# Patient Record
Sex: FEMALE | Race: WHITE | Employment: FULL TIME | URBAN - METROPOLITAN AREA
[De-identification: names, ages, dates, MRNs, and addresses within clinical notes are randomized per-mention and may not be internally consistent; named-entity substitution may affect disease eponyms.]

---

## 2017-03-20 ENCOUNTER — HOSPITAL ENCOUNTER (OUTPATIENT)
Dept: BEHAVIORAL HEALTH | Facility: CLINIC | Age: 52
End: 2017-03-20
Attending: SOCIAL WORKER
Payer: COMMERCIAL

## 2017-03-20 VITALS
HEIGHT: 62 IN | SYSTOLIC BLOOD PRESSURE: 167 MMHG | BODY MASS INDEX: 23 KG/M2 | HEART RATE: 91 BPM | WEIGHT: 125 LBS | DIASTOLIC BLOOD PRESSURE: 104 MMHG

## 2017-03-20 PROCEDURE — H0001 ALCOHOL AND/OR DRUG ASSESS: HCPCS

## 2017-03-20 ASSESSMENT — ANXIETY QUESTIONNAIRES
6. BECOMING EASILY ANNOYED OR IRRITABLE: SEVERAL DAYS
1. FEELING NERVOUS, ANXIOUS, OR ON EDGE: NEARLY EVERY DAY
GAD7 TOTAL SCORE: 10
7. FEELING AFRAID AS IF SOMETHING AWFUL MIGHT HAPPEN: NOT AT ALL
2. NOT BEING ABLE TO STOP OR CONTROL WORRYING: MORE THAN HALF THE DAYS
4. TROUBLE RELAXING: MORE THAN HALF THE DAYS
3. WORRYING TOO MUCH ABOUT DIFFERENT THINGS: MORE THAN HALF THE DAYS
5. BEING SO RESTLESS THAT IT IS HARD TO SIT STILL: NOT AT ALL

## 2017-03-20 ASSESSMENT — PAIN SCALES - GENERAL: PAINLEVEL: NO PAIN (0)

## 2017-03-21 ENCOUNTER — HOSPITAL ENCOUNTER (OUTPATIENT)
Dept: BEHAVIORAL HEALTH | Facility: CLINIC | Age: 52
Discharge: HOME | End: 2017-03-21
Attending: SOCIAL WORKER | Admitting: SOCIAL WORKER
Payer: COMMERCIAL

## 2017-03-21 VITALS
BODY MASS INDEX: 22.15 KG/M2 | SYSTOLIC BLOOD PRESSURE: 174 MMHG | HEIGHT: 63 IN | DIASTOLIC BLOOD PRESSURE: 94 MMHG | WEIGHT: 125 LBS | HEART RATE: 98 BPM

## 2017-03-21 PROCEDURE — H0001 ALCOHOL AND/OR DRUG ASSESS: HCPCS

## 2017-03-21 ASSESSMENT — PAIN SCALES - GENERAL: PAINLEVEL: NO PAIN (0)

## 2017-03-21 ASSESSMENT — ANXIETY QUESTIONNAIRES
GAD7 TOTAL SCORE: 10
4. TROUBLE RELAXING: NOT AT ALL
1. FEELING NERVOUS, ANXIOUS, OR ON EDGE: MORE THAN HALF THE DAYS
3. WORRYING TOO MUCH ABOUT DIFFERENT THINGS: MORE THAN HALF THE DAYS
7. FEELING AFRAID AS IF SOMETHING AWFUL MIGHT HAPPEN: NOT AT ALL
2. NOT BEING ABLE TO STOP OR CONTROL WORRYING: MORE THAN HALF THE DAYS
6. BECOMING EASILY ANNOYED OR IRRITABLE: SEVERAL DAYS
5. BEING SO RESTLESS THAT IT IS HARD TO SIT STILL: NOT AT ALL
GAD7 TOTAL SCORE: 7

## 2017-03-21 ASSESSMENT — PATIENT HEALTH QUESTIONNAIRE - PHQ9: SUM OF ALL RESPONSES TO PHQ QUESTIONS 1-9: 14
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
## 2017-03-21 NOTE — PROGRESS NOTES
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 involvement that is harmful to recovery or treatment progress, or (B) Client has an actively antagonistic significant other, family, work, or living environment with immediate threat to the client s safety and well-being.     REASONS SEVERITY WAS ASSIGNED (What support does the person have for making changes? What structure/stability does the person have in his or her daily life that will increase the likelihood that changes can be sustained? What problems exist in the person s environment that will jeopardize getting/staying clean and sober?)     Patient lives with her boyfriend sometimes, or she stays at a hotel or in her car. Her current living situation is unsupportive towards recovery. Patient reported having relationship conflict with boyfriend due to their ongoing substance use. Patient lacks a current sober support network. Patient denied having any concerns regarding immediate living environment or neighborhood. Patient is employed part-time and lacks a daily structure and meaningful activities. Patient reported a history of legal issues, including 2 DUIs in her 20s. Patient denied current legal involvement.          Client Choice/Exceptions   Would you like services specific to  language, age, gender, culture, Jain preference, race, ethnicity, sexual orientation or disability?  No     What particular treatment choices and options would you like to have? Inpatient     Do you have a preference for a particular treatment program? [facility], but they have a waitlist.          Criteria for Diagnosis   DSM-5 Criteria for Substance Use Disorder     Instructions: Determine whether the client currently meets the criteria for Substance Use Disorder using the diagnostic criteria in the DSM-V pp.[id number]. Current means during the most recent 12 months outside a facility that controls access to substances.     Category of substance Severity (ICD-10 Code / DSM 5 Code)   Alcohol Use Disorder   Severe  (10.20) (303.90)   Cannabis Use Disorder   NA   Hallucinogen Use Disorder   NA   Inhalant Use Disorder   NA   Opioid Use Disorder   NA   Sedative, Hypnotic or Anxiolytic Use Disorder   NA   Stimulant Use Disorder   Mild  (F14.10) (305.60) Cocaine   Tobacco Use Disorder   NA   Other (or Unknown) Substance Use Disorder   NA   Does not meet DSM-5 criteria for substance use disorder   NA - See above       Collateral Contact Summary   Number of contacts made: 1   Contact with referring person: NA   If court related records were reviewed, summarize here: NA         Rule 25 Assessment Summary and Plan   's Recommendation    It is recommended that patient:  1). Participate in and complete a 28-day lodging/residential substance use treatment program.  2). Follow all subsequent recommendations of the substance use treatment providers.   3). Abstain from alcohol and all mood-altering substances, except as prescribed. Take all medications as prescribed.   4). Attend AA at least three times weekly and obtain a female sponsor for additional sober supports. Consider attending Al-Anon to address effects of alcohol use in family of origin.  5). Become involved in a daily sober recreational activity/hobby of  her own interest.  6). Have a mental health evaluation to determine accurate diagnoses and which psychotropic medications would be effective.   7). Begin weekly individual mental health therapy with a trauma-informed therapist.  8). Work with primary counselor to address suicidal ideation and complete the Patient Safety Plan.          Collateral Contacts   Name:    James Olson   Relationship:     Ex- Phone Number:    [phone] Releases:    Yes   Information provided:    On 03/21/2017, Mr. Olson completed a Concerned Person Information Sheet. He stated he used to drink heavily with patient years ago, but she has not slowed down for at least 15 years. He stated patient drinks at least 3 to 4 alcohol drinks daily. She uses cocaine, but he doesn't know how much or how often. She is not a regular marijuana user. He stated she has continued to use alcohol despite many consequences. Since he has known her, she has had depression and thoughts of suicide several times.          DSM 5 Criteria   A problematic pattern of alcohol/drug use leading to clinically significant impairment or distress, as manifested by at least two of the following, occurring within a 12-month period: 11/11     Alcohol/drug is often taken in larger amounts or over a longer period than was intended.  There is a persistent desire or unsuccessful efforts to cut down or control alcohol/drug use  A great deal of time is spent in activities necessary to obtain alcohol, use alcohol, or recover from its effects.  Craving, or a strong desire or urge to use alcohol/drug  Recurrent alcohol/drug use resulting in a failure to fulfill major role obligations at work, school or home.  Continued alcohol use despite having persistent or recurrent social or interpersonal problems caused or exacerbated by the effects of alcohol/drug.  Important social, occupational, or recreational activities are given up or reduced because of alcohol/drug use.  Recurrent  alcohol/drug use in situations in which it is physically hazardous.  Alcohol/drug use is continued despite knowledge of having a persistent or recurrent physical or psychological problem that is likely to have been caused or exacerbated by alcohol.  Tolerance, as defined by either of the following: A need for markedly increased amounts of alcohol/drug to achieve intoxication or desired effect.  Withdrawal, as manifested by either of the following: The characteristic withdrawal syndrome for alcohol/drug (refer to Criteria A and B of the criteria set for alcohol/drug withdrawal).     Specify if: In early remission:  After full criteria for alcohol/drug use disorder were previously met, none of the criteria for alcohol/drug use disorder have been met for at least 3 months but for less than 12 months (with the exception that Criterion A4,  Craving or a strong desire or urge to use alcohol/drug  may be met).      In sustained remission:   After full criteria for alcohol use disorder were previously met, non of the criteria for alcohol/drug use disorder have been met at any time during a period of 12 months or longer (with the exception that Criterion A4,  Craving or strong desire or urge to use alcohol/drug  may be met).    Specify if:   This additional specifier is used if the individual is in an environment where access to alcohol is restricted.  Mild: Presence of 2-3 symptoms  Moderate: Presence of 4-5 symptoms  Severe: Presence of 6 or more symptoms

## 2017-03-22 ASSESSMENT — ANXIETY QUESTIONNAIRES: GAD7 TOTAL SCORE: 7

## 2017-03-22 ASSESSMENT — PATIENT HEALTH QUESTIONNAIRE - PHQ9: SUM OF ALL RESPONSES TO PHQ QUESTIONS 1-9: 11
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
## 2017-03-29 ENCOUNTER — HOSPITAL ENCOUNTER (OUTPATIENT)
Dept: BEHAVIORAL HEALTH | Facility: CLINIC | Age: 52
End: 2017-03-29
Attending: PSYCHIATRY & NEUROLOGY
Payer: COMMERCIAL

## 2017-03-29 VITALS — SYSTOLIC BLOOD PRESSURE: 147 MMHG | HEART RATE: 96 BPM | TEMPERATURE: 98.8 F | DIASTOLIC BLOOD PRESSURE: 86 MMHG

## 2017-03-29 PROBLEM — F19.20 CHEMICAL DEPENDENCY (H): Status: ACTIVE | Noted: 2017-03-29

## 2017-03-29 PROCEDURE — 10020000 ZZH LODGING PLUS FACILITY CHARGE ADULT

## 2017-03-29 RX ORDER — LORATADINE 10 MG/1
10 TABLET ORAL DAILY
COMMUNITY

## 2017-03-29 RX ORDER — MAGNESIUM HYDROXIDE/ALUMINUM HYDROXICE/SIMETHICONE 120; 1200; 1200 MG/30ML; MG/30ML; MG/30ML
30 SUSPENSION ORAL EVERY 6 HOURS PRN
COMMUNITY

## 2017-03-29 RX ORDER — EPINEPHRINE 0.3 MG/.3ML
0.3 INJECTION SUBCUTANEOUS PRN
COMMUNITY

## 2017-03-29 RX ORDER — AMOXICILLIN 250 MG
2 CAPSULE ORAL
COMMUNITY

## 2017-03-29 RX ORDER — ALBUTEROL SULFATE 90 UG/1
1-2 AEROSOL, METERED RESPIRATORY (INHALATION) EVERY 4 HOURS PRN
COMMUNITY
End: 2017-04-03

## 2017-03-29 RX ORDER — NICOTINE 21 MG/24HR
1 PATCH, TRANSDERMAL 24 HOURS TRANSDERMAL EVERY 24 HOURS
COMMUNITY
End: 2017-04-03

## 2017-03-29 ASSESSMENT — ANXIETY QUESTIONNAIRES
6. BECOMING EASILY ANNOYED OR IRRITABLE: SEVERAL DAYS
2. NOT BEING ABLE TO STOP OR CONTROL WORRYING: SEVERAL DAYS
4. TROUBLE RELAXING: NOT AT ALL
1. FEELING NERVOUS, ANXIOUS, OR ON EDGE: MORE THAN HALF THE DAYS
3. WORRYING TOO MUCH ABOUT DIFFERENT THINGS: SEVERAL DAYS
5. BEING SO RESTLESS THAT IT IS HARD TO SIT STILL: NOT AT ALL
7. FEELING AFRAID AS IF SOMETHING AWFUL MIGHT HAPPEN: NOT AT ALL
GAD7 TOTAL SCORE: 5

## 2017-03-29 NOTE — PROGRESS NOTES
LP Admission Update    Date of update:     3/29/2017 Update Evaluation Counselor:      Ellie Hill     Date of original Rule 25:    3/21/2017    Original Evaluation Counselor:      Bernadette Urbina MSW, [facility], [facility]       PHQ-9  Score 10 or greater No   CHIP-7  Score 10 or greater No   Patient has suicidal ideation No   Patient needs medication adjustment No   Patient has worsening depression No   Patient has omaira No   Patient has unmanageable anxiety, behavioral interventions have not been successful No   Patient wants to be seen for Naltrexone or Antabuse No   *Patient has seen Dr. Rodriguez on 3A/psych/medical floor.  (if yes, the patient will need a follow-up) No   Transfer from in-house medical or psych unit No   Patients with a mental health diagnosis coming from shelter without any medications No   Patient with a history of anxiety whose DOC was methamphetamine, cocaine or crack No   Other clinical justification for a mental health evaluation  Please explain: NA   No   If the patient responded yes to any of the above questions except for *, then the patient would need to be scheduled for an initial mental health screen.     Suicide Screening Questions:     1. Are you feeling hopeless about the present/future? Mixed   2. Have you ever had thoughts about taking your life? Yes   3. When did you have these thoughts? She wishes she weren't here, but denied suicidal plan and intent. She stated she is terrified. She denied past suicide attempts.   4. Do you have any current intent or active desire to take your life? No   5. Do you have a plan to take your life?  No   6. Have you ever made a suicide attempt? No   7. Do you have access to pills, guns or other methods to kill yourself? No                  Risk Status - Use as Guide/Example     Ideation - Active  Thoughts of suicide Intent to follow  Through on suicide Plan for completing  suicide    Yes No Yes No Yes No   Emergent X   X   X     Urgent /  "Non-Emergent X   X     X   Non-Urgent X     X   X   No Current / Active Risk (Past 6 Months)   X   X   X   Haley Olson Yes No No         Additional Risk Factors: Significant history of having untreated or poorly treated mental health symptoms  A recent death of someone close to the patient and/or unresolved grief and loss issues  Significant history of trauma and/or abuse issues  History of impulsive or aggressive behaviors   Protective Factors:  Having people in his/her life that would prevent the patient from considering committing suicide (i.e. young children, spouse, parents, etc.)  Having easy access to supportive family members  Having restricted access to highly lethal means of suicide      Risk Status:     Emergent? No  Urgent / Non-Emergent?  No  Present / Non- Urgent? Yes, Document in Epic / SBAR to counselor, Collaborate with patient / client to develop \"Patient Safety Plan\", Referral to PCP or psychiatrist, Address in Treatment Plan, Continuous monitoring, assessment and intervention and Address in Discharge / Transition Plan   No Current Risk? See above     Additional information to support suicide risk rating: Patient wishes she were not alive, but denied suicidal plan and intent.         Current PATRICK:       .000   Current UA:   Positive for TCA and BZO and negative for all other screened drugs.     Alcohol/Drug use since last CD evaluation (include date and time of last use):   Last use of alcohol: 3/28/2017 at midnight  Last use of cocaine: 1 week ago  Benzo: 2-3 days ago. BF gave her something for her hangover.  TCA: false positive for benadryl     Please note any other clinical changes since the last CD evaluation (such as medication changes, additional legal charges, detoxification admissions, overdoses, etc):    No significant changes since the last CD evaluation       Current ASAM Dimensions      Intoxication and Withdrawal: 1  Biomedical:  1  Emotional and Behavioral:  2  Readiness to Change: "  0  Relapse Potential: 4  Recovery Environmental:  4

## 2017-03-29 NOTE — PROGRESS NOTES
Initial Services Plan        Before your first treatment group, please do the following    Immediate health & safety concerns: Look for sober housing and a supportive social network.  Obtain personal items (glasses, hearing aides, medicines, diabetes supplies, clothing, etc.).  Look for a support network (such as AA, NA, DBT group, a Jew group, etc.)    Suggestions for client during the time between intake & completion of treatment plan:  Tour your treatment center (unit or outpatient clinic).  Introduce yourself to the treatment group.  Spend time getting to know your peers.  Complete your psycho-social paperwork.  Complete the problem list for your treatment plan.  Start drug and alcohol use history.  Review your patient or client handbook.  Identify concerns about whom to ask for family week    Client issues to be addressed in the first treatment sessions:  Identify concerns about coming into treatment, i.e. fear of failing again, sharing a room in treatment      SRINI Willingham  3/29/2017  7:54 AM

## 2017-03-29 NOTE — PROGRESS NOTES
[facility] Health Assessment    Patient Name:  Haley Olson  Date of review:  3/29/2017  Vital signs: BP: 147/86   Pulse:  96   Temp: 98.8    Direct admission    Counselor: Michael Helton  Drug of Choice: Alcohol  Last use: 3/28/2017  Home clinic/MD: None  Psychiatrist/therapist: None    Medical history/current conditions: None - but needs H & P as it has been over a year since last one and needs a refill on her albuterol inhaler    Mental Health diagnosis: None  Medication compliant?: Yes  Recent sucidal thoughts? None     When? N/A  Current thought of self-harm? None    Plan? N/A  Pt. Self rating of impulsiveness? (1-10 scale): 2    Pain assessment:   Pt. Experiencing pain at this time? No  Rating on 0-10 scale: (1-10 scale): 0  Location: None  Result of: N/A  L P pain management strategy: OTC medications  On-going nursing intervention required?   No
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
## 2017-03-30 ENCOUNTER — HOSPITAL ENCOUNTER (OUTPATIENT)
Dept: BEHAVIORAL HEALTH | Facility: CLINIC | Age: 52
End: 2017-03-30
Attending: PSYCHIATRY & NEUROLOGY
Payer: COMMERCIAL

## 2017-03-30 PROCEDURE — 10020000 ZZH LODGING PLUS FACILITY CHARGE ADULT

## 2017-03-30 PROCEDURE — H2035 A/D TX PROGRAM, PER HOUR: HCPCS | Mod: HQ

## 2017-03-30 ASSESSMENT — ANXIETY QUESTIONNAIRES: GAD7 TOTAL SCORE: 5

## 2017-03-30 ASSESSMENT — PATIENT HEALTH QUESTIONNAIRE - PHQ9: SUM OF ALL RESPONSES TO PHQ QUESTIONS 1-9: 7

## 2017-03-30 NOTE — PROGRESS NOTES
Patient Safety Plan  Step 1: Warning signs (thoughts, images, mood, situation, behavior) that a crisis may be developin. Increased heart rate  2. Anxiety   3. Feeling sad and afraid    Step 2: Internal coping strategies -- Things I can do to take my mind off my problems without contacting another person (relaxation techniques, mindfulness, physical activity):  1. Deep Breaths  2. Exercise  3. A good cry    Step 3: People and social settings that provide distraction:  1. Name Johnathan Breaux       Phone Available on cell phone  2. Name Marbin Ross         Phone: [phone]  3. Name Kinga Álvarez       Phone: [phone]        4. ________________________    Step 4: People whom I can ask for help:               Same as above    Step 5: Professionals or agencies I can contact during a crisis:    1. While in treatment at , contact counseling or support staff if having a crisis or any thought of self-harm.  2. If you have a mental health professional (psychotherapist, psychiatrist): No  Clinician pager or emergency contact #_______________________________  3. If you have a primary care physician (PCP) No- I d go to urgent care    See list below for additional resources:  Crisis Connection [phone]      [facility] [phone]                               [facility] [phone]                [facility]  [phone]  [facility] Crisis Line ([facility])[phone]   [facility] Crisis Services ([facility]) [phone]  Mental Health Crisis Program ([facility]) [phone]  National Suicide Prevention [phone]        [facility] [phone]  Suicide Prevention [phone]              Text for Life: Text the word  LIFE  to 07039.         Call 061 or go to your nearest Emergency department.    Step 6: Ways I can make my environment safer:    1. Recycling    Step 7: The one thing that is most important to me and worth living for is: My son and my boyfriend    Step  8: Client has reviewed with counseling staff and agrees to this safety plan. Client has been given a copy of the plan:    Client Signature: ______________________________Time/Date: _______________    Staff Signature:  ______________________________Time/Date: _______________

## 2017-03-30 NOTE — PROGRESS NOTES
1400 - Pt offered Tamiflu for potential exposure to Influenza B. Pt agreed. TO order from Dr. Torres for the following:    Oseltamivir Phosphate 75 mg caps.  Take one capsule by mouth every day for 7 days. Dispense 7 capsules. No refills    [facility]    Also, good handwashing technique reviewed with pt

## 2017-03-31 ENCOUNTER — HOSPITAL ENCOUNTER (OUTPATIENT)
Dept: BEHAVIORAL HEALTH | Facility: CLINIC | Age: 52
End: 2017-03-31
Attending: PSYCHIATRY & NEUROLOGY
Payer: COMMERCIAL

## 2017-03-31 PROCEDURE — 10020000 ZZH LODGING PLUS FACILITY CHARGE ADULT

## 2017-03-31 PROCEDURE — H2035 A/D TX PROGRAM, PER HOUR: HCPCS | Mod: HQ

## 2017-03-31 PROCEDURE — H2035 A/D TX PROGRAM, PER HOUR: HCPCS

## 2017-03-31 NOTE — PROGRESS NOTES
Comprehensive Assessment Summary     Based on client interview, review of previous assessments and   comprehensive assessment interview the following diagnosis and recommendations are:     Patient: Haley Olson  MRN; [id number]   : 1965  Age: 51 year old Sex: female       Client meets criteria for:  303.90 Alcohol Dependence  305.10 Nicotine Dependence    Dimension One: Acute Intoxication/Withdrawal Potential     Ratin  (Consider the client's ability to cope with withdrawal symptoms and current state of intoxication)     Pt reports her last use of alcohol was on 3/28/2017. Pt reported drinking around eight beers the day before entering treatment and denies any current withdrawal symptoms.     Dimension Two: Biomedical Condition and Complications    Ratin  (Consider the degree to which any physical disorder would interfere with treatment for substance abuse, and the client's ability to tolerate any related discomfort; determine the impact of continued chemical use on the unborn child if the client is pregnant)     Pt denies any medical diagnosis or concerning medical symptoms that would interfere with treatment participation. Pt verbalizes the desire to see an MD for an updated physical while in treatment. Pt would also like the opportunity to be set up with a primary provider within the community.     Dimension Three: Emotional/Behavioral/Cognitive Conditions & Complications  Ratin  (Determine the degree to which any condition or complications are likely to interfere with treatment for substance abuse or with functioning in significant life areas and the likelihood of risk of harm to self or others)     Pt completed suicide risk assessment upon intake and is at no risk. Pt denies history of or current suicidal ideation, but will continued to be monitored weekly for any changes. Pt denies a formal mental health diagnosis or current  provider. Pt feels that she has been using alcohol as a  means of coping with daily anxiety. Pt reports anxiety symptoms include racing thoughts, worrying about the future, and insomnia. Pt would like to meet with staff psychiatrist and learn additional tools for anxiety and stress management.     Dimension Four: Treatment Acceptance/Resistance     Ratin  (Consider the amount of support and encouragement necessary to keep the client involved in treatment)     Pt rates her motivation for treatment a 10/10 and verbalizes internal motivation for sobriety. Pt denies any current legals.     Dimension Five: Continued Use/Relaspe Prevention     Ratin  (Consider the degree to which the client's recognizes relapse issues and has the skills to prevent relapse of either substance use or mental health problems)     Pt reports previous attempts to cut down on drinking have been un-successful. Pt identifies her  triggers are using alcohol to relax before and after work, and will occasionally binge drink in order to fall asleep. Pt's boyfriend also drinks heavily and they frequently drink alcohol together. Pt lacks coping skills to address triggers and urges to use.     Dimension Six: Recovery Environment     Rating:   3  (Consider the degree to which key areas of the client's life are supportive of or antagonistic to treatment participation and recovery)     Pt reports she has been living with her boyfriend who also has issues with substance use. Pt reports they frequently get into verbal arguments and she will check into a hotel for a few days. Pt reports being recently let go from her job as a  after missing her shift. Pt states this was due to not setting the hotel alarm correctly. Pt reports living with her brother is an alternative for housing after treatment.  Pt has limited sober support, but is open to attending AA and other community support meetings. Pt verbalizes she struggles at times with the idea of a higher power and spirituality. Pt reports  interest in identifying new hobbies and interests, as alcohol has always been used during her free time. Pt lacks meaningful and daily structured activity.     I have reviewed the information on the assessment, psychosocial and medical history and checklist:        it is current

## 2017-04-01 ENCOUNTER — HOSPITAL ENCOUNTER (OUTPATIENT)
Dept: BEHAVIORAL HEALTH | Facility: CLINIC | Age: 52
End: 2017-04-01
Attending: PSYCHIATRY & NEUROLOGY
Payer: COMMERCIAL

## 2017-04-01 PROCEDURE — 10020000 ZZH LODGING PLUS FACILITY CHARGE ADULT

## 2017-04-01 PROCEDURE — H2035 A/D TX PROGRAM, PER HOUR: HCPCS | Mod: HQ

## 2017-04-02 ENCOUNTER — HOSPITAL ENCOUNTER (OUTPATIENT)
Dept: BEHAVIORAL HEALTH | Facility: CLINIC | Age: 52
End: 2017-04-02
Attending: PSYCHIATRY & NEUROLOGY
Payer: COMMERCIAL

## 2017-04-02 PROCEDURE — H2035 A/D TX PROGRAM, PER HOUR: HCPCS | Mod: HQ

## 2017-04-02 PROCEDURE — 10020000 ZZH LODGING PLUS FACILITY CHARGE ADULT

## 2017-04-03 ENCOUNTER — HOSPITAL ENCOUNTER (OUTPATIENT)
Dept: BEHAVIORAL HEALTH | Facility: CLINIC | Age: 52
End: 2017-04-03
Attending: PSYCHIATRY & NEUROLOGY
Payer: COMMERCIAL

## 2017-04-03 ENCOUNTER — OFFICE VISIT (OUTPATIENT)
Dept: BEHAVIORAL HEALTH | Facility: CLINIC | Age: 52
End: 2017-04-03
Payer: COMMERCIAL

## 2017-04-03 ENCOUNTER — TELEPHONE (OUTPATIENT)
Dept: FAMILY MEDICINE | Facility: CLINIC | Age: 52
End: 2017-04-03

## 2017-04-03 VITALS
DIASTOLIC BLOOD PRESSURE: 95 MMHG | SYSTOLIC BLOOD PRESSURE: 158 MMHG | TEMPERATURE: 98.6 F | BODY MASS INDEX: 23.4 KG/M2 | OXYGEN SATURATION: 96 % | HEART RATE: 112 BPM | RESPIRATION RATE: 14 BRPM | WEIGHT: 130 LBS

## 2017-04-03 DIAGNOSIS — F17.200 TOBACCO USE DISORDER: ICD-10-CM

## 2017-04-03 DIAGNOSIS — N89.8 VAGINAL DISCHARGE: ICD-10-CM

## 2017-04-03 DIAGNOSIS — Z11.3 ROUTINE SCREENING FOR STI (SEXUALLY TRANSMITTED INFECTION): Primary | ICD-10-CM

## 2017-04-03 DIAGNOSIS — N76.0 BACTERIAL VAGINOSIS: ICD-10-CM

## 2017-04-03 DIAGNOSIS — Z01.419 ENCOUNTER FOR GYNECOLOGICAL EXAMINATION WITHOUT ABNORMAL FINDING: ICD-10-CM

## 2017-04-03 DIAGNOSIS — B96.89 BACTERIAL VAGINOSIS: ICD-10-CM

## 2017-04-03 DIAGNOSIS — F41.8 DEPRESSION WITH ANXIETY: ICD-10-CM

## 2017-04-03 LAB
MICRO REPORT STATUS: ABNORMAL
SPECIMEN SOURCE: ABNORMAL
WET PREP SPEC: ABNORMAL

## 2017-04-03 PROCEDURE — 86803 HEPATITIS C AB TEST: CPT | Performed by: NURSE PRACTITIONER

## 2017-04-03 PROCEDURE — 87591 N.GONORRHOEAE DNA AMP PROB: CPT | Performed by: NURSE PRACTITIONER

## 2017-04-03 PROCEDURE — G0145 SCR C/V CYTO,THINLAYER,RESCR: HCPCS | Performed by: NURSE PRACTITIONER

## 2017-04-03 PROCEDURE — H2035 A/D TX PROGRAM, PER HOUR: HCPCS | Mod: HQ

## 2017-04-03 PROCEDURE — 87624 HPV HI-RISK TYP POOLED RSLT: CPT | Performed by: NURSE PRACTITIONER

## 2017-04-03 PROCEDURE — 10020000 ZZH LODGING PLUS FACILITY CHARGE ADULT

## 2017-04-03 PROCEDURE — 86780 TREPONEMA PALLIDUM: CPT | Performed by: NURSE PRACTITIONER

## 2017-04-03 PROCEDURE — 87210 SMEAR WET MOUNT SALINE/INK: CPT | Performed by: NURSE PRACTITIONER

## 2017-04-03 PROCEDURE — 99204 OFFICE O/P NEW MOD 45 MIN: CPT | Performed by: NURSE PRACTITIONER

## 2017-04-03 PROCEDURE — 36415 COLL VENOUS BLD VENIPUNCTURE: CPT | Performed by: NURSE PRACTITIONER

## 2017-04-03 PROCEDURE — 87491 CHLMYD TRACH DNA AMP PROBE: CPT | Performed by: NURSE PRACTITIONER

## 2017-04-03 RX ORDER — METRONIDAZOLE 500 MG/1
500 TABLET ORAL 2 TIMES DAILY
Qty: 14 TABLET | Refills: 0 | Status: SHIPPED | OUTPATIENT
Start: 2017-04-03

## 2017-04-03 RX ORDER — NICOTINE 21 MG/24HR
1 PATCH, TRANSDERMAL 24 HOURS TRANSDERMAL EVERY 24 HOURS
Qty: 30 PATCH | Refills: 3 | Status: SHIPPED | OUTPATIENT
Start: 2017-04-03

## 2017-04-03 RX ORDER — ALBUTEROL SULFATE 90 UG/1
1-2 AEROSOL, METERED RESPIRATORY (INHALATION) EVERY 4 HOURS PRN
Qty: 1 INHALER | Refills: 3 | Status: SHIPPED | OUTPATIENT
Start: 2017-04-03

## 2017-04-03 NOTE — LETTER
April 10, 2017    Haley Olson  [address] [address]    Dear Haley,  We are happy to inform you that your PAP smear result from 4/3/17 is normal.  We are now able to do a follow up test on PAP smears. The DNA test is for HPV (Human Papilloma Virus). Cervical cancer is closely linked with certain types of HPV. Your result showed no evidence of high risk HPV.  Therefore we recommend you return in 3 years for your next pap smear.  You will still need to return to the clinic every year for an annual exam and other preventive tests.  Please contact the clinic at [phone] with any questions.  Sincerely,    RENNY Centeno CNP/pineda

## 2017-04-03 NOTE — MR AVS SNAPSHOT
After Visit Summary   4/3/2017    Haley Olson    MRN: [id number]           Patient Information     Date Of Birth          1965        Visit Information        Provider Department      4/3/2017 8:30 AM Nithya Goodson APRN [facility]        Today's Diagnoses     Routine screening for STI (sexually transmitted infection)    -  1    Encounter for gynecological examination without abnormal finding        Vaginal discharge        Tobacco use disorder        Depression with anxiety        Bacterial vaginosis          Care Instructions      Tips for Quitting Smoking (Cardiovascular)  Quitting smoking is a gift to yourself, one of the best things you can do to keep your heart disease from getting worse. Smoking reduces oxygen flow to your heart, speeds the buildup of plaque, and increases your risk for heart attack, also known as acute myocardial infarction, or AMI. Quitting helps reduce smoking's harmful effects. You may have tried to quit before, but don t give up. Try again. Many smokers try four or five times before they succeed.     You ll have the best chance of success if you join a stop-smoking group and have the support of family and friends.     Line up help    Ask for the support of your family and friends.    Join a quit-smoking class, or ask your health care provider for a referral to a psychologist who specializes in helping people quit smoking.     Ask your health care provider about nicotine replacement products and prescription medications that can help you quit.  Set a quit date    Choose a date within the next 2 to 4 weeks.    After picking a day, jessica it in bold letters on a calendar.     Your quit list  Start by giving up cigarettes at the times you least need them. Write down a few more ideas.     Set limits    Limit where you can smoke. Pick one room or a porch, and smoke only in that place.    Make smoking outdoors a house rule. Other  smokers won t tempt you as much.    Hang a list of   quit benefits  in the spot where you smoke. Put one on the refrigerator and one on your car dashboard.     For more information    smokefree.gov/ivvd-no-ym-expert    National Cancer [place] Smoking Quitline: 877-44U-QUIT (693-756-0633)        4741-4475 [employer]. [address] [place] [address]. All rights reserved. This information is not intended as a substitute for professional medical care. Always follow your healthcare professional's instructions.              Follow-ups after your visit        Follow-up notes from your care team     Return in about 2 weeks (around 4/17/2017) for medication management, Chemical Dependency.      Your next 10 appointments already scheduled     Apr 04, 2017  7:15 AM CDT   Treatment with ADULT LODGING PLUS E   [facility] ([facility])    [address] [place] [address]   [phone]            Apr 05, 2017  7:15 AM CDT   Treatment with ADULT LODGING PLUS E   [facility] ([facility])    [address] [place] [address]   [phone]            Apr 06, 2017  7:15 AM CDT   Treatment with ADULT LODGING PLUS E   [facility] ([facility])    [address] [place] [address]   [phone]            Apr 07, 2017  7:15 AM CDT   Treatment with ADULT LODGING PLUS E   [facility] ([facility])    [address] [place] [address]   [phone]            Apr 08, 2017  7:15 AM CDT   Treatment with ADULT LODGING PLUS E   [facility] ([facility])    [address] [place] [address]   [phone]            Apr 09, 2017  7:15  AM CDT   Treatment with ADULT LODGING PLUS E   [facility] ([facility])    [facility] [address] [address]   [phone]            Apr 10, 2017  7:15 AM CDT   Treatment with ADULT LODGING PLUS E   [facility] ([facility])    [address] [place] [address]   [phone]            Apr 11, 2017  7:15 AM CDT   Treatment with ADULT LODGING PLUS E   [facility] ([facility])    [address] [place] [address]   [phone]            Apr 12, 2017  7:15 AM CDT   Treatment with ADULT LODGING PLUS E   [facility] ([facility])    [address] [place] [address]   [phone]            Apr 13, 2017  7:15 AM CDT   Treatment with ADULT LODGING PLUS E   [facility] ([facility])    [address] [place] [address]   [phone]              Who to contact     If you have questions or need follow up information about today's clinic visit or your schedule please contact [facility] directly at [phone].  Normal or non-critical lab and imaging results will be communicated to you by MyChart, letter or phone within 4 business days after the clinic has received the results. If you do not hear from us within 7 days, please contact the clinic through MyChart or phone. If you have a critical or abnormal lab result, we will notify you by phone as soon as possible.  Submit refill requests through [employer] or call your pharmacy and they will forward the refill request to us. Please allow 3 business days for your refill to be completed.          Additional Information About Your Visit        [email or web address] Information   "   [employer] lets you send messages to your doctor, view your test results, renew your prescriptions, schedule appointments and more. To sign up, go to [email or web address] . Click on \"Log in\" on the left side of the screen, which will take you to the Welcome page. Then click on \"Sign up Now\" on the right side of the page.     You will be asked to enter the access code listed below, as well as some personal information. Please follow the directions to create your username and password.     Your access code is: [email or web address]  Expires: 2017  2:08 PM     Your access code will  in 90 days. If you need help or a new code, please call your [facility] clinic or [phone].        Care EveryWhere ID     This is your Care EveryWhere ID. This could be used by other organizations to access your [facility] medical records  [id number]        Your Vitals Were     Pulse Temperature Respirations Pulse Oximetry BMI (Body Mass Index)       112 98.6  F (37  C) (Oral) 14 96% 23.4 kg/m2        Blood Pressure from Last 3 Encounters:   17 (!) 158/95   16 161/90   11 112/69    Weight from Last 3 Encounters:   17 130 lb (59 kg)   16 125 lb (56.7 kg)   11 145 lb 11.6 oz (66.1 kg)              We Performed the Following     **Hepatitis C Screen Reflex to RNA FUTURE anytime     Anti Treponema     Chlamydia trachomatis PCR     HPV High Risk Types DNA Cervical     Neisseria gonorrhoeae PCR     Pap imaged thin layer screen with HPV - recommended age 30 - 65     Wet prep          Today's Medication Changes          These changes are accurate as of: 4/3/17  2:08 PM.  If you have any questions, ask your nurse or doctor.               Start taking these medicines.        Dose/Directions    metroNIDAZOLE 500 MG tablet   Commonly known as:  FLAGYL   Used for:  Vaginal discharge, Bacterial vaginosis   Started by:  Nithya Goodson APRN CNP        Dose:  500 mg   Take 1 tablet (500 mg) by mouth 2 times " daily   Quantity:  14 tablet   Refills:  0            Where to get your medicines      These medications were sent to [facility] [place], [place] - [address]  [address] [facility] [address], [facility] [address]     Phone:  [phone]     albuterol 108 (90 BASE) MCG/ACT Inhaler    metroNIDAZOLE 500 MG tablet    nicotine 21 MG/24HR 24 hr patch                Primary Care Provider    Physician No Ref-Primary       No address on file        Thank you!     Thank you for choosing [facility]  for your care. Our goal is always to provide you with excellent care. Hearing back from our patients is one way we can continue to improve our services. Please take a few minutes to complete the written survey that you may receive in the mail after your visit with us. Thank you!             Your Updated Medication List - Protect others around you: Learn how to safely use, store and throw away your medicines at www.disposemymeds.org.          This list is accurate as of: 4/3/17  2:08 PM.  Always use your most recent med list.                   Brand Name Dispense Instructions for use    albuterol 108 (90 BASE) MCG/ACT Inhaler    PROAIR HFA/PROVENTIL HFA/VENTOLIN HFA    1 Inhaler    Inhale 1-2 puffs into the lungs every 4 hours as needed for shortness of breath / dyspnea or wheezing       alum & mag hydroxide-simethicone 200-200-20 MG/5ML Susp suspension    MYLANTA/MAALOX     Take 30 mLs by mouth every 6 hours as needed for indigestion       DIPHENHYDRAMINE HCL PO      Take 50 mg by mouth Take 1-2 tabs every 4-6 hours       EPINEPHrine 0.3 MG/0.3ML injection      Inject 0.3 mg into the muscle as needed for anaphylaxis       guaiFENesin 20 mg/mL Soln solution    ROBITUSSIN     Take 10 mLs by mouth every 4 hours as needed for cough       IBUPROFEN PO      Take 400 mg by mouth every 6 hours as needed for moderate pain       loratadine 10 MG tablet    CLARITIN     Take 10 mg by mouth daily        metroNIDAZOLE 500 MG tablet    FLAGYL    14 tablet    Take 1 tablet (500 mg) by mouth 2 times daily       nicotine 21 MG/24HR 24 hr patch    NICODERM CQ    30 patch    Place 1 patch onto the skin every 24 hours       omalizumab 150 MG injection    XOLAIR     Inject Subcutaneous every 28 days       phenol-menthol 14.5 MG lozenge      Place 1 lozenge inside cheek every 2 hours as needed for moderate pain       senna-docusate 8.6-50 MG per tablet    SENOKOT-S;PERICOLACE     Take 2 tablets by mouth nightly as needed for constipation

## 2017-04-03 NOTE — LETTER
[facility]  [address]  [address]  [facility] [address]  [phone]        April 3, 2017      Haley ELIAN Wesley  [address] [facility] [facility] [address]          Dear Ms. Olson,    The results of your recent lab tests indicate that you have a vaginal bacterial infection. Enclosed is a copy of these results. Nithya Goodson sent in a prescription for metronidazole (Flagyl) to the [facility]. Please take this twice a day for one week. Do not drink alcohol while you are taking this medication.     Sincerely,        The office of Nithya Goodson, CNP        Results for orders placed or performed in visit on 04/03/17   Wet prep   Result Value Ref Range    Specimen Description Vagina     Wet Prep (A)      Clue cells seen  No yeast seen  No Trichomonas seen      Micro Report Status FINAL 04/03/2017

## 2017-04-03 NOTE — PATIENT INSTRUCTIONS
Tips for Quitting Smoking (Cardiovascular)  Quitting smoking is a gift to yourself, one of the best things you can do to keep your heart disease from getting worse. Smoking reduces oxygen flow to your heart, speeds the buildup of plaque, and increases your risk for heart attack, also known as acute myocardial infarction, or AMI. Quitting helps reduce smoking's harmful effects. You may have tried to quit before, but don t give up. Try again. Many smokers try four or five times before they succeed.     You ll have the best chance of success if you join a stop-smoking group and have the support of family and friends.     Line up help    Ask for the support of your family and friends.    Join a quit-smoking class, or ask your health care provider for a referral to a psychologist who specializes in helping people quit smoking.     Ask your health care provider about nicotine replacement products and prescription medications that can help you quit.  Set a quit date    Choose a date within the next 2 to 4 weeks.    After picking a day, jessica it in bold letters on a calendar.     Your quit list  Start by giving up cigarettes at the times you least need them. Write down a few more ideas.     Set limits    Limit where you can smoke. Pick one room or a porch, and smoke only in that place.    Make smoking outdoors a house rule. Other smokers won t tempt you as much.    Hang a list of   quit benefits  in the spot where you smoke. Put one on the refrigerator and one on your car dashboard.     For more information    smokefree.gov/muqh-yx-gj-expert    National Cancer [place] Smoking Quitline: 877-44U-QUIT (478-523-3787)        0580-4666 [employer]. [address] [place] [address], [place] [address]. All rights reserved. This information is not intended as a substitute for professional medical care. Always follow your healthcare professional's instructions.

## 2017-04-03 NOTE — PROGRESS NOTES
Writer met with patient to review comprehensive assessment summary and discuss treatment plan goals. Following morning group, patient was given completed treatment plan and reviewed with counselor. Pt denied any questions or concerns regarding treatment plan interventions.     SRINI Chiu

## 2017-04-03 NOTE — PROGRESS NOTES
SUBJECTIVE:                                                    Haley Olson is a 51 year old female from [facility] who presents to clinic today for the following health issues:    [facility] patient: Alcohol Dependency treatment.   Here from [facility] program with concerns about vaginal discharge, exposure to STIs, need for PAP (has been over 5 years).  Would also like support with quitting smoking.      STI Testing  She would also like to screen for STIs. Her recent partner had venereal warts and had them removed. He was treating them with cream. One has recently come back. He also has Hep C. She would like a test for chlamydia, gonorrhea, Hep C, and HPV. She also agrees to have a Pap smear done today.  Patient has a history of bacterial vaginitis and thinks that this is probably what is.    Mental Health Followup: Depression Anxiety.     Status since last visit: Stable     See PHQ-9 for current symptoms.  Other associated symptoms: None    Complicating factors:   Significant life event:  No   Current substance abuse:  None  Anxiety or Panic symptoms:  No    PHQ-9  English PHQ-9   Any Language           Social History   Substance Use Topics     Smoking status: Current Every Day Smoker     Packs/day: 1.00     Types: Cigarettes     Smokeless tobacco: Never Used     Alcohol use Yes      Comment: Not currently        Problem list and histories reviewed & adjusted, as indicated.  Additional history: as documented    Patient Active Problem List   Diagnosis     CARDIOVASCULAR SCREENING; LDL GOAL LESS THAN 160     Chemical dependency (H)     Past Surgical History:   Procedure Laterality Date     DILATION AND CURETTAGE SUCTION  7/21/2011    Procedure:DILATION AND CURETTAGE SUCTION; Surgeon:YANNICK STERN; Location:[facility]     GYN SURGERY  2001       Social History   Substance Use Topics     Smoking status: Current Every Day Smoker     Packs/day: 1.00     Types: Cigarettes     Smokeless tobacco: Never Used      Alcohol use Yes      Comment: Not currently     Family History   Problem Relation Age of Onset     Substance Abuse Mother      Cardiovascular Father      heart attack     Substance Abuse Brother      Substance Abuse Brother          Current Outpatient Prescriptions   Medication Sig Dispense Refill     DIPHENHYDRAMINE HCL PO Take 50 mg by mouth Take 1-2 tabs every 4-6 hours       nicotine (NICODERM CQ) 21 MG/24HR 24 hr patch Place 1 patch onto the skin every 24 hours       IBUPROFEN PO Take 400 mg by mouth every 6 hours as needed for moderate pain       alum & mag hydroxide-simethicone (MYLANTA/MAALOX) 200-200-20 MG/5ML SUSP suspension Take 30 mLs by mouth every 6 hours as needed for indigestion       guaiFENesin (ROBITUSSIN) 20 mg/mL SOLN solution Take 10 mLs by mouth every 4 hours as needed for cough       phenol-menthol (CEPASTAT) 14.5 MG lozenge Place 1 lozenge inside cheek every 2 hours as needed for moderate pain       loratadine (CLARITIN) 10 MG tablet Take 10 mg by mouth daily       senna-docusate (SENOKOT-S;PERICOLACE) 8.6-50 MG per tablet Take 2 tablets by mouth nightly as needed for constipation       albuterol (PROAIR HFA/PROVENTIL HFA/VENTOLIN HFA) 108 (90 BASE) MCG/ACT Inhaler Inhale 1-2 puffs into the lungs every 4 hours as needed for shortness of breath / dyspnea or wheezing       EPINEPHrine 0.3 MG/0.3ML injection Inject 0.3 mg into the muscle as needed for anaphylaxis       omalizumab (XOLAIR) 150 MG injection Inject Subcutaneous every 28 days       Allergies   Allergen Reactions     Amoxicillin Anaphylaxis     Sulfa Drugs Anaphylaxis     Recent Labs   Lab Test  07/31/11   1245  07/26/11   0010  03/24/11   0115   ALT  18   --   12   CR  0.63  0.64  0.58   GFRESTIMATED  >90  >90  >90   GFRESTBLACK  >90  >90  >90   POTASSIUM  4.2  3.7  4.7      BP Readings from Last 3 Encounters:   04/03/17 (!) 158/95   05/01/16 161/90   07/21/11 112/69    Wt Readings from Last 3 Encounters:   04/03/17 59 kg (130 lb)    05/01/16 56.7 kg (125 lb)   07/21/11 66.1 kg (145 lb 11.6 oz)        Labs reviewed in EPIC  Problem list, Medication list, Allergies, and Medical/Social/Surgical histories reviewed in [facility] and updated as appropriate.     ROS: 10 point ROS neg other than the symptoms noted above in the HPI.    This document serves as a record of the services and decisions personally performed and made by Nithya Goodson CNP. It was created on her behalf by Yamileth Simpson, a trained medical scribe. The creation of this document is based on the provider's statements to the medical scribe.  Yamileth Simpson 8:50 AM 4/3/2017    OBJECTIVE:                                                    BP (!) 158/95 (BP Location: Left arm, Patient Position: Chair, Cuff Size: Adult Regular)  Pulse 112  Temp 98.6  F (37  C) (Oral)  Resp 14  Wt 59 kg (130 lb)  SpO2 96%  BMI 23.4 kg/m2  Body mass index is 23.4 kg/(m^2).  GENERAL: healthy, alert, well nourished, well hydrated, no distress  Cardiovascular: rate and rhythm regular, normal S1,S2 , PMI normal.No murmurs, clicks gallops or rub  Respiratory: normal respiratory rate and rhythm, lungs clear to auscultation, no rales or rhonchi  Head: Normocephalic. No masses, lesions, tenderness or abnormalities  Neck: Neck supple. No adenopathy. Thyroid symmetric, normal size,, Carotids without bruits.  ENT: ENT exam normal, no neck nodes or sinus tenderness  Abdomen: Abdomen soft, non-tender. BS normal. No masses, organomegaly  SKIN: no suspicious lesions or rashes  Pelvic exam   Normal external genital exam, no sign of genital warts  No cervical motion tenderness  Thick white discharge  Normal bimanual exam no uterine or adnexal axial tenderness or mass  Normal digital rectal exam  Pap smear done results pending  Wet prep done, results pending  Gen-Probe done, results pending    MENTAL HEALTH  Appearance:  awake, alert and adequately groomed  Attitude:  cooperative  Eye Contact:  good  Mood:   anxious  Affect: good  Speech:  clear, coherent  Psychomotor Behavior:  no evidence of tardive dyskinesia, dystonia, or tics  Thought Process:  logical and linear  Associations:  no loose associations  Thought Content:  no evidence of suicidal ideation or homicidal ideation and no evidence of psychotic thought  Insight:  good  Judgment:  Fair  Oriented to:  time, person, and place  Attention Span and Concentration:  good  Recent and Remote Memory:  intact  Fund of Knowledge: appropriate  Muscle Strength and Tone: normal     Diagnostic Test Results:  Results for orders placed or performed in visit on 04/03/17 (from the past 24 hour(s))   Wet prep   Result Value Ref Range    Specimen Description Vagina     Wet Prep (A)      Clue cells seen  No yeast seen  No Trichomonas seen      Micro Report Status FINAL 04/03/2017         ASSESSMENT/PLAN:                                                    (Z11.3) Routine screening for STI (sexually transmitted infection)  (primary encounter diagnosis)  Comment: New Partner, concern about exposure to STI.    Plan: **Hepatitis C Screen Reflex to RNA FUTURE         anytime, Anti Treponema, Wet prep, Chlamydia         trachomatis PCR, Neisseria gonorrhoeae PCR        Will call with results that require further intervention, otherwise will send a letter.      (Z01.419) Encounter for gynecological examination without abnormal finding  Comment: Overdue for a PAP  Plan: Pap imaged thin layer screen with HPV -         recommended age 30 - 65, HPV High Risk Types         DNA Cervical        Will call with results that require further intervention, otherwise will send a letter.      (N89.8) Vaginal discharge: Bacterial vaginosis  Comment: Willing to take treatment at this time. Wet Prep significant for Clue Cells,   Plan: Wet prep        Metronidazole 500 mg BID X 7 days.     (F17.200) Tobacco use disorder  Comment: States she is ready to quit would like nicotine patches and a refill on her  inhaler.   Plan: albuterol (PROAIR HFA/PROVENTIL HFA/VENTOLIN         HFA) 108 (90 BASE) MCG/ACT Inhaler, nicotine         (NICODERM CQ) 21 MG/24HR 24 hr patch             Patient Instructions:  See Pt instructions    Today at our visit we discussed:   MEDICATIONS:   Orders Placed This Encounter   Medications     albuterol (PROAIR HFA/PROVENTIL HFA/VENTOLIN HFA) 108 (90 BASE) MCG/ACT Inhaler     Sig: Inhale 1-2 puffs into the lungs every 4 hours as needed for shortness of breath / dyspnea or wheezing     Dispense:  1 Inhaler     Refill:  3     nicotine (NICODERM CQ) 21 MG/24HR 24 hr patch     Sig: Place 1 patch onto the skin every 24 hours     Dispense:  30 patch     Refill:  3     metroNIDAZOLE (FLAGYL) 500 MG tablet     Sig: Take 1 tablet (500 mg) by mouth 2 times daily     Dispense:  14 tablet     Refill:  0     Medications Discontinued During This Encounter   Medication Reason     albuterol (PROAIR HFA/PROVENTIL HFA/VENTOLIN HFA) 108 (90 BASE) MCG/ACT Inhaler Reorder     nicotine (NICODERM CQ) 21 MG/24HR 24 hr patch Reorder          - Continue other medications without change  FUTURE APPOINTMENTS:       - Follow-up visit in 2 weeks with persistent symtoms.   Regular exercise    Labs:  The following labs were done today: STI testing for chlamydia, gonnorhea, HPV. Testing for Hep C. Pap smear.    Greater than 40 minutes was spent with this patient, with greater than 50 % in counselling about STIs and testing, preventative care, Depression, vaginal infection, and Smoking cessation.        The information in this document, created by the medical scribe, Yamileth Simpson, for me, accurately reflects the services I personally performed and the decisions made by me. I have reviewed and approved this document for accuracy prior to leaving the patient care area.  RENNY [facility] PRIMARY [facility]

## 2017-04-03 NOTE — PROGRESS NOTES
Acknowledgement of Current Treatment Plan       I have reviewed my treatment plan with my therapist / counselor on 3/31/17. I agree with the plan as it is written in the electronic health record.    Name Signature   Haley Olson    Name of Therapist / Counselor    [facility] Bosch [facility]

## 2017-04-03 NOTE — TELEPHONE ENCOUNTER
Could you please call [email or web address] and let Haley know that she has a vaginal bacterial infection and I have ordered treatment for one week with Metronidazole.  She may know this and she is in treatment, however, She should not have any alcohol while taking this medication.      Nithya Goodson CNP, APNP  [facility] Primary Care [facility]

## 2017-04-03 NOTE — TELEPHONE ENCOUNTER
Discussed with nurse @ [facility]. Letter pending for patient; nurse to print and deliver letter to patient.    DANDRE FranciscoN, RN  [facility]

## 2017-04-03 NOTE — NURSING NOTE
"Chief Complaint   Patient presents with     RECHECK       Initial BP (!) 158/95 (BP Location: Left arm, Patient Position: Chair, Cuff Size: Adult Regular)  Pulse 112  Temp 98.6  F (37  C) (Oral)  Resp 14  Wt 130 lb (59 kg)  SpO2 96%  BMI 23.4 kg/m2 Estimated body mass index is 23.4 kg/(m^2) as calculated from the following:    Height as of 3/21/17: 5' 2.5\" (1.588 m).    Weight as of this encounter: 130 lb (59 kg).  Medication Reconciliation: complete     Tristen Limon MA    "

## 2017-04-04 ENCOUNTER — HOSPITAL ENCOUNTER (OUTPATIENT)
Dept: BEHAVIORAL HEALTH | Facility: CLINIC | Age: 52
End: 2017-04-04
Attending: PSYCHIATRY & NEUROLOGY
Payer: COMMERCIAL

## 2017-04-04 LAB
C TRACH DNA SPEC QL NAA+PROBE: NORMAL
HCV AB SERPL QL IA: NORMAL
N GONORRHOEA DNA SPEC QL NAA+PROBE: NORMAL
SPECIMEN SOURCE: NORMAL
SPECIMEN SOURCE: NORMAL
T PALLIDUM IGG+IGM SER QL: NEGATIVE

## 2017-04-04 PROCEDURE — H2035 A/D TX PROGRAM, PER HOUR: HCPCS | Mod: HQ

## 2017-04-04 PROCEDURE — 10020000 ZZH LODGING PLUS FACILITY CHARGE ADULT

## 2017-04-04 PROCEDURE — 90792 PSYCH DIAG EVAL W/MED SRVCS: CPT | Performed by: PSYCHIATRY & NEUROLOGY

## 2017-04-04 NOTE — H&P
IDENTIFYING INFORMATION:  The patient is a 51-year-old  female who works as a .  She is .  She has 1 child.        HISTORY OF PRESENT ILLNESS:  Haley Olson began to drink alcohol at age of 17 and by 21 it was a problem.  She has been drinking regularly 10 beers or so.  She has tolerance to alcohol, withdrawal, progressive use, spending more time, more money.  She had work problems which made her decide that she needs to get help.  She has impacted her work and money.  She does not use any drugs.  She smokes 1 pack a day.  She does have a history of postpartum depression.  She says that she gets anxious and this anxiety is situational where she gets overwhelmed, she worries excessively and when she gets this worried she feels nervous.  She is not able to stop worrying.  She worries about different things and she becomes easily annoyed and is afraid something bad will happen.  She does also get depressed.  When she gets depressed she feels that she has trouble falling asleep.  She feels tired, poor appetite.  She does not have any suicidal or homicidal ideation, denied auditory or visual hallucinations.      PAST PSYCHIATRIC HISTORY:  Psychiatrically hospitalized once in 2017 for alcohol poisoning and that is when she did a CD treatment.      MEDICAL HISTORY AND REVIEW OF SYSTEMS: 10 point ros  Negative. Except pruritis        Current Outpatient Prescriptions on File Prior to Encounter:  busPIRone (BUSPAR) 7.5 MG tablet bid   albuterol (PROAIR HFA/PROVENTIL HFA/VENTOLIN HFA) 108 (90 BASE) MCG/ACT Inhaler Inhale 1-2 puffs into the lungs every 4 hours as needed for shortness of breath / dyspnea or wheezing   nicotine (NICODERM CQ) 21 MG/24HR 24 hr patch Place 1 patch onto the skin every 24 hours   metroNIDAZOLE (FLAGYL) 500 MG tablet Take 1 tablet (500 mg) by mouth 2 times daily   DIPHENHYDRAMINE HCL PO Take 50 mg by mouth Take 1-2 tabs every 4-6 hours   IBUPROFEN PO Take 400 mg by mouth  every 6 hours as needed for moderate pain   alum & mag hydroxide-simethicone (MYLANTA/MAALOX) 200-200-20 MG/5ML SUSP suspension Take 30 mLs by mouth every 6 hours as needed for indigestion   guaiFENesin (ROBITUSSIN) 20 mg/mL SOLN solution Take 10 mLs by mouth every 4 hours as needed for cough   phenol-menthol (CEPASTAT) 14.5 MG lozenge Place 1 lozenge inside cheek every 2 hours as needed for moderate pain   loratadine (CLARITIN) 10 MG tablet Take 10 mg by mouth daily   senna-docusate (SENOKOT-S;PERICOLACE) 8.6-50 MG per tablet Take 2 tablets by mouth nightly as needed for constipation   EPINEPHrine 0.3 MG/0.3ML injection Inject 0.3 mg into the muscle as needed for anaphylaxis   omalizumab (XOLAIR) 150 MG injection Inject Subcutaneous every 28 days     Current Facility-Administered Medications on File Prior to Encounter:  Self Administer Medications: Behavioral Services     FAMILY HISTORY:  She has chronic urticaria and a brother has alcoholism.      SOCIAL HISTORY:  She was born in [place], [place].  She has 2 brothers and 2 sisters.  Father  when she was young.  She moved back to [place].  She has some college.  She is .  Support system consists of friends and family.  Stressors include money, stressed  relationship with her boyfriend who she describes as addicted to pornography .       MENTAL STATUS EXAMINATION:  The patient is a 51-year-old  female who appears her stated age.  She has adequate grooming, adequate hygiene.  She maintains good eye contact, cooperative.  No psychomotor abnormalities.  No gait problems.  Mood is anxious.  Affect is congruent.  Speech is spontaneous, normal in rate and volume.  Linear thought process, no loosening of association.  Does not have any suicidal or homicidal ideation, denied auditory hallucinations.fair insight/judgement  Alert and oriented x3.  Recent and remote memory, language, fund of knowledge are all adequate.  No loose associations.         DIAGNOSES:   Axis I     1.  Unspecified anxiety disorder.   2.  Alcohol dependence.      PLAN:  The patient will be started on BuSpar 7. 5 mg twice a day.  The patient will return to me in 1 week or earlier if there is a problem.         RAFAEL PAYNE MD             D: 2017 12:50   T: 2017 13:38   MT:       Name:     VIANNEY BECKFORD   MRN:      [id number]        Account:      [id number]   :      1965           Admitted:     [id number]      Document: [id number]

## 2017-04-05 ENCOUNTER — HOSPITAL ENCOUNTER (OUTPATIENT)
Dept: BEHAVIORAL HEALTH | Facility: CLINIC | Age: 52
End: 2017-04-05
Attending: PSYCHIATRY & NEUROLOGY
Payer: COMMERCIAL

## 2017-04-05 LAB
COPATH REPORT: NORMAL
PAP: NORMAL

## 2017-04-05 PROCEDURE — H2035 A/D TX PROGRAM, PER HOUR: HCPCS | Mod: HQ

## 2017-04-05 PROCEDURE — 10020000 ZZH LODGING PLUS FACILITY CHARGE ADULT

## 2017-04-06 ENCOUNTER — HOSPITAL ENCOUNTER (OUTPATIENT)
Dept: BEHAVIORAL HEALTH | Facility: CLINIC | Age: 52
End: 2017-04-06
Attending: PSYCHIATRY & NEUROLOGY
Payer: COMMERCIAL

## 2017-04-06 ENCOUNTER — OFFICE VISIT (OUTPATIENT)
Dept: FAMILY MEDICINE | Facility: CLINIC | Age: 52
End: 2017-04-06
Payer: COMMERCIAL

## 2017-04-06 VITALS
DIASTOLIC BLOOD PRESSURE: 87 MMHG | TEMPERATURE: 98.5 F | OXYGEN SATURATION: 100 % | SYSTOLIC BLOOD PRESSURE: 154 MMHG | BODY MASS INDEX: 22.77 KG/M2 | WEIGHT: 128.5 LBS | HEIGHT: 63 IN | HEART RATE: 68 BPM | RESPIRATION RATE: 16 BRPM

## 2017-04-06 DIAGNOSIS — Z00.00 ROUTINE GENERAL MEDICAL EXAMINATION AT A HEALTH CARE FACILITY: Primary | ICD-10-CM

## 2017-04-06 DIAGNOSIS — F10.10 ALCOHOL ABUSE: ICD-10-CM

## 2017-04-06 LAB
FINAL DIAGNOSIS: NORMAL
HPV HR 12 DNA CVX QL NAA+PROBE: NEGATIVE
HPV16 DNA SPEC QL NAA+PROBE: NEGATIVE
HPV18 DNA SPEC QL NAA+PROBE: NEGATIVE
SPECIMEN DESCRIPTION: NORMAL

## 2017-04-06 PROCEDURE — 99214 OFFICE O/P EST MOD 30 MIN: CPT | Performed by: PHYSICIAN ASSISTANT

## 2017-04-06 PROCEDURE — H2035 A/D TX PROGRAM, PER HOUR: HCPCS | Mod: HQ

## 2017-04-06 PROCEDURE — 10020000 ZZH LODGING PLUS FACILITY CHARGE ADULT
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
## 2017-04-06 NOTE — MR AVS SNAPSHOT
After Visit Summary   4/6/2017    Haley Olson    MRN: [id number]           Patient Information     Date Of Birth          1965        Visit Information        Provider Department      4/6/2017 2:30 PM Giuseppe Goins PA-C [facility] Integrated Primary [facility]        Today's Diagnoses     Routine general medical examination at a health care facility    -  1    Alcohol abuse           Follow-ups after your visit        Your next 10 appointments already scheduled     Apr 07, 2017  7:15 AM CDT   Treatment with ADULT LODGING PLUS E   [facility] ([facility])    [address] [place] [address]   [phone]            Apr 08, 2017  7:15 AM CDT   Treatment with ADULT LODGING PLUS E   [facility] ([facility])    [address] [place] [address]   [phone]            Apr 09, 2017  7:15 AM CDT   Treatment with ADULT LODGING PLUS E   [facility] ([facility])    [address] [place] [address]   [phone]            Apr 10, 2017  7:15 AM CDT   Treatment with ADULT LODGING PLUS E   [facility] ([facility])    [address] [place] [address]   [phone]            Apr 11, 2017  7:15 AM CDT   Treatment with ADULT LODGING PLUS E   [facility] ([facility])    [address] [place] [address]   [phone]            Apr 12, 2017  7:15 AM CDT   Treatment with ADULT LODGING PLUS E   [facility] ([facility])    [address] [place] [address]   [phone]            Apr 13, 2017  7:15 AM CDT   Treatment with ADULT LODGING  "PLUS E   [facility] ([facility])    [facility] [address] [address]   [phone]            Apr 14, 2017  7:15 AM CDT   Treatment with ADULT LODGING PLUS E   [facility] ([facility])    [facility] [address] [address]   [phone]            Apr 15, 2017  7:15 AM CDT   Treatment with ADULT LODGING PLUS E   [facility] ([facility])    [address] [place] [address]   [phone]            Apr 16, 2017  7:15 AM CDT   Treatment with ADULT LODGING PLUS E   [facility] ([facility])    [address] [place] [address]   [phone]              Who to contact     If you have questions or need follow up information about today's clinic visit or your schedule please contact [facility] PR[facility] directly at [phone].  Normal or non-critical lab and imaging results will be communicated to you by [employer], letter or phone within 4 business days after the clinic has received the results. If you do not hear from us within 7 days, please contact the clinic through [employer] [facility] or phone. If you have a critical or abnormal lab result, we will notify you by phone as soon as possible.  Submit refill requests through [employer] or call your pharmacy and they will forward the refill request to us. Please allow 3 business days for your refill to be completed.          Additional Information About Your Visit        [employer] Information     [employer] lets you send messages to your doctor, view your test results, renew your prescriptions, schedule appointments and more. To sign up, go to [email or web address] . Click on \"Log in\" on the left side of the screen, which will take you to the Welcome page. " "Then click on \"Sign up Now\" on the right side of the page.     You will be asked to enter the access code listed below, as well as some personal information. Please follow the directions to create your username and password.     Your access code is: [email or web address]  Expires: 2017  2:08 PM     Your access code will  in 90 days. If you need help or a new code, please call your Mobile clinic or [phone].        Care EveryWhere ID     This is your Care EveryWhere ID. This could be used by other organizations to access your Mobile medical records  [id number]        Your Vitals Were     Pulse Temperature Respirations Height Pulse Oximetry BMI (Body Mass Index)    68 98.5  F (36.9  C) (Oral) 16 5' 2.5\" (1.588 m) 100% 23.13 kg/m2       Blood Pressure from Last 3 Encounters:   17 154/87   17 (!) 158/95   16 161/90    Weight from Last 3 Encounters:   17 128 lb 8 oz (58.3 kg)   17 130 lb (59 kg)   16 125 lb (56.7 kg)              Today, you had the following     No orders found for display         Today's Medication Changes          These changes are accurate as of: 17  3:06 PM.  If you have any questions, ask your nurse or doctor.               Start taking these medicines.        Dose/Directions    vitamin B complex with vitamin C Tabs tablet   Used for:  Alcohol abuse   Started by:  Giuseppe Goins PA-C        Dose:  1 tablet   Take 1 tablet by mouth daily   Quantity:  100 tablet   Refills:  1            Where to get your medicines      These medications were sent to [facility] [address]  [address] [address]     Phone:  [phone]     vitamin B complex with vitamin C Tabs tablet                Primary Care Provider    Physician No Ref-Primary       No address on file        Thank you!     Thank you for choosing [facility]  for your care. Our goal is always to " provide you with excellent care. Hearing back from our patients is one way we can continue to improve our services. Please take a few minutes to complete the written survey that you may receive in the mail after your visit with us. Thank you!             Your Updated Medication List - Protect others around you: Learn how to safely use, store and throw away your medicines at www.disposemymeds.org.          This list is accurate as of: 4/6/17  3:06 PM.  Always use your most recent med list.                   Brand Name Dispense Instructions for use    albuterol 108 (90 BASE) MCG/ACT Inhaler    PROAIR HFA/PROVENTIL HFA/VENTOLIN HFA    1 Inhaler    Inhale 1-2 puffs into the lungs every 4 hours as needed for shortness of breath / dyspnea or wheezing       alum & mag hydroxide-simethicone 200-200-20 MG/5ML Susp suspension    MYLANTA/MAALOX     Take 30 mLs by mouth every 6 hours as needed for indigestion       busPIRone 7.5 MG tablet    BUSPAR    60 tablet    bid       DIPHENHYDRAMINE HCL PO      Take 50 mg by mouth Take 1-2 tabs every 4-6 hours       EPINEPHrine 0.3 MG/0.3ML injection      Inject 0.3 mg into the muscle as needed for anaphylaxis       guaiFENesin 20 mg/mL Soln solution    ROBITUSSIN     Take 10 mLs by mouth every 4 hours as needed for cough       IBUPROFEN PO      Take 400 mg by mouth every 6 hours as needed for moderate pain       loratadine 10 MG tablet    CLARITIN     Take 10 mg by mouth daily       metroNIDAZOLE 500 MG tablet    FLAGYL    14 tablet    Take 1 tablet (500 mg) by mouth 2 times daily       nicotine 21 MG/24HR 24 hr patch    NICODERM CQ    30 patch    Place 1 patch onto the skin every 24 hours       omalizumab 150 MG injection    XOLAIR     Inject Subcutaneous every 28 days       phenol-menthol 14.5 MG lozenge      Place 1 lozenge inside cheek every 2 hours as needed for moderate pain       senna-docusate 8.6-50 MG per tablet    SENOKOT-S;PERICOLACE     Take 2 tablets by mouth nightly as  needed for constipation       vitamin B complex with vitamin C Tabs tablet     100 tablet    Take 1 tablet by mouth daily

## 2017-04-06 NOTE — NURSING NOTE
"Chief Complaint   Patient presents with     Lodging Plus       Initial /87  Pulse 68  Temp 98.5  F (36.9  C) (Oral)  Resp 16  Ht 5' 2.5\" (1.588 m)  Wt 128 lb 8 oz (58.3 kg)  SpO2 100%  BMI 23.13 kg/m2 Estimated body mass index is 23.13 kg/(m^2) as calculated from the following:    Height as of this encounter: 5' 2.5\" (1.588 m).    Weight as of this encounter: 128 lb 8 oz (58.3 kg).  Medication Reconciliation: complete   Onelia Hutton MA      Patient states she has had some tingling and numbness sensation in the fingers and arms on the right and left side. Primarily on the left. Symptoms feels worse when crossing the legs, experiencing this for 5 days   Onelia Hutton MA      "

## 2017-04-07 ENCOUNTER — HOSPITAL ENCOUNTER (OUTPATIENT)
Dept: BEHAVIORAL HEALTH | Facility: CLINIC | Age: 52
End: 2017-04-07
Attending: PSYCHIATRY & NEUROLOGY
Payer: COMMERCIAL

## 2017-04-07 NOTE — PROGRESS NOTES
4/7/17  Pt participated in a group sessions focusing on the relationship she has had with her mother in childhood, adolescence and at the current time. The goal of this session was to better understand the relationship, begin to see mother as a woman, beyond her mother role, and to bond together with peers in their shared experience as daughters.   Counselors facilitated group session.  Pt appeared open and genuine in sharing her thoughts and feelings. Pt completed and shared a letter to her mother and a letter she would like to receive from her mother.

## 2017-04-08 ENCOUNTER — HOSPITAL ENCOUNTER (OUTPATIENT)
Dept: BEHAVIORAL HEALTH | Facility: CLINIC | Age: 52
End: 2017-04-08
Attending: PSYCHIATRY & NEUROLOGY
Payer: COMMERCIAL

## 2017-04-08 PROCEDURE — H2035 A/D TX PROGRAM, PER HOUR: HCPCS | Mod: HQ

## 2017-04-08 PROCEDURE — 10020000 ZZH LODGING PLUS FACILITY CHARGE ADULT

## 2017-04-09 ENCOUNTER — HOSPITAL ENCOUNTER (OUTPATIENT)
Dept: BEHAVIORAL HEALTH | Facility: CLINIC | Age: 52
End: 2017-04-09
Attending: PSYCHIATRY & NEUROLOGY
Payer: COMMERCIAL

## 2017-04-09 PROCEDURE — H2035 A/D TX PROGRAM, PER HOUR: HCPCS | Mod: HQ

## 2017-04-09 PROCEDURE — 10020000 ZZH LODGING PLUS FACILITY CHARGE ADULT

## 2017-04-09 NOTE — PROGRESS NOTES
DIM. 6: Relationships Weekend Workshop 4/8/2017-4/9/2017  D.) Patient participated in the weekend workshop on relationships. The workshop is designed to develop a greater awareness of how addiction has impacted relationships with self and others. Patient attended lectures and actively participated in skills training exercises.  I.) Workshop facilitated by staff counselors.  A.) Patient appeared cooperative and engaged in programming. Pt seemed to gain insight regarding healthy and supportive relationships in recovery.  P.) Continue to heal those relationships damaged by addiction.

## 2017-04-10 ENCOUNTER — HOSPITAL ENCOUNTER (OUTPATIENT)
Dept: BEHAVIORAL HEALTH | Facility: CLINIC | Age: 52
End: 2017-04-10
Attending: PSYCHIATRY & NEUROLOGY
Payer: COMMERCIAL

## 2017-04-10 PROCEDURE — 10020000 ZZH LODGING PLUS FACILITY CHARGE ADULT

## 2017-04-10 PROCEDURE — 99213 OFFICE O/P EST LOW 20 MIN: CPT | Performed by: PSYCHIATRY & NEUROLOGY

## 2017-04-10 PROCEDURE — H2035 A/D TX PROGRAM, PER HOUR: HCPCS | Mod: HQ

## 2017-04-10 NOTE — PROGRESS NOTES
Interim history   This is a 51 year old female with 1. Unspecified anxiety disorder.   2. Alcohol dependence. .Pt seen . Patient's mood is BETTER 'stopped her buspar because itmade her spacy   Energy Level:MODERATE  Sleep:No concerns, sleeps well through night  Appetite:normal motivation interest are good  Denied any Suicidal/homicidal ideation/plan intent. Denied psychosis    Tolerating meds and has no side effects.         No prior suicide attempts      Past Medical History:   Diagnosis Date     Seasonal allergies        10 point ROS is negative   constitutional    HEENT - no symptoms   RESPIRATORY-no symptoms   CVS-no symptoms   GI-no symptoms  MUSCKULOSKELETAL -no symptoms  NEUROLOGICAL-no symptoms  ENDOCRINE-no symptoms  HEMATAOLOGY-no symptoms  SKIN-no symptoms  Family History   Problem Relation Age of Onset     Substance Abuse Mother      Cardiovascular Father      heart attack     Substance Abuse Brother      Substance Abuse Brother      Social History     Social History     Marital status:      Spouse name: N/A     Number of children: N/A     Years of education: N/A     Occupational History     Not on file.     Social History Main Topics     Smoking status: Current Every Day Smoker     Packs/day: 1.00     Types: Cigarettes     Smokeless tobacco: Never Used     Alcohol use Yes      Comment: Not currently     Drug use: Yes     Sexual activity: Yes     Partners: Male     Birth control/ protection: None     Other Topics Concern     Not on file     Social History Narrative     FAMILY HISTORY: She has chronic urticaria and a brother has alcoholism.       SOCIAL HISTORY: She was born in [place], [place]. She has 2 brothers and 2 sisters. Father  when she was young. She moved back to [place]. She has some college. She is . Support system consists of friends and family. Stressors include money, stressed relationship with her boyfriend who she describes as addicted to  pornography .        Medications:     Current Outpatient Prescriptions   Medication Sig     vitamin B complex with vitamin C (VITAMIN  B COMPLEX) TABS tablet Take 1 tablet by mouth daily     busPIRone (BUSPAR) 7.5 MG tablet bid     albuterol (PROAIR HFA/PROVENTIL HFA/VENTOLIN HFA) 108 (90 BASE) MCG/ACT Inhaler Inhale 1-2 puffs into the lungs every 4 hours as needed for shortness of breath / dyspnea or wheezing     nicotine (NICODERM CQ) 21 MG/24HR 24 hr patch Place 1 patch onto the skin every 24 hours     metroNIDAZOLE (FLAGYL) 500 MG tablet Take 1 tablet (500 mg) by mouth 2 times daily     DIPHENHYDRAMINE HCL PO Take 50 mg by mouth Take 1-2 tabs every 4-6 hours     IBUPROFEN PO Take 400 mg by mouth every 6 hours as needed for moderate pain     alum & mag hydroxide-simethicone (MYLANTA/MAALOX) 200-200-20 MG/5ML SUSP suspension Take 30 mLs by mouth every 6 hours as needed for indigestion     guaiFENesin (ROBITUSSIN) 20 mg/mL SOLN solution Take 10 mLs by mouth every 4 hours as needed for cough     phenol-menthol (CEPASTAT) 14.5 MG lozenge Place 1 lozenge inside cheek every 2 hours as needed for moderate pain     loratadine (CLARITIN) 10 MG tablet Take 10 mg by mouth daily     senna-docusate (SENOKOT-S;PERICOLACE) 8.6-50 MG per tablet Take 2 tablets by mouth nightly as needed for constipation     EPINEPHrine 0.3 MG/0.3ML injection Inject 0.3 mg into the muscle as needed for anaphylaxis     omalizumab (XOLAIR) 150 MG injection Inject Subcutaneous every 28 days     No current facility-administered medications for this encounter.      Facility-Administered Medications Ordered in Other Encounters   Medication     Self Administer Medications: Behavioral Services        Current Outpatient Prescriptions on File Prior to Encounter:  vitamin B complex with vitamin C (VITAMIN  B COMPLEX) TABS tablet Take 1 tablet by mouth daily   busPIRone (BUSPAR) 7.5 MG tablet bid   albuterol (PROAIR HFA/PROVENTIL HFA/VENTOLIN HFA) 108 (90  BASE) MCG/ACT Inhaler Inhale 1-2 puffs into the lungs every 4 hours as needed for shortness of breath / dyspnea or wheezing   nicotine (NICODERM CQ) 21 MG/24HR 24 hr patch Place 1 patch onto the skin every 24 hours   metroNIDAZOLE (FLAGYL) 500 MG tablet Take 1 tablet (500 mg) by mouth 2 times daily   DIPHENHYDRAMINE HCL PO Take 50 mg by mouth Take 1-2 tabs every 4-6 hours   IBUPROFEN PO Take 400 mg by mouth every 6 hours as needed for moderate pain   alum & mag hydroxide-simethicone (MYLANTA/MAALOX) 200-200-20 MG/5ML SUSP suspension Take 30 mLs by mouth every 6 hours as needed for indigestion   guaiFENesin (ROBITUSSIN) 20 mg/mL SOLN solution Take 10 mLs by mouth every 4 hours as needed for cough   phenol-menthol (CEPASTAT) 14.5 MG lozenge Place 1 lozenge inside cheek every 2 hours as needed for moderate pain   loratadine (CLARITIN) 10 MG tablet Take 10 mg by mouth daily   senna-docusate (SENOKOT-S;PERICOLACE) 8.6-50 MG per tablet Take 2 tablets by mouth nightly as needed for constipation   EPINEPHrine 0.3 MG/0.3ML injection Inject 0.3 mg into the muscle as needed for anaphylaxis   omalizumab (XOLAIR) 150 MG injection Inject Subcutaneous every 28 days     Current Facility-Administered Medications on File Prior to Encounter:  Self Administer Medications: Behavioral Services          Allergies:     Allergies   Allergen Reactions     Amoxicillin Anaphylaxis     Sulfa Drugs Anaphylaxis            Psychiatric Examination:     Weight is 0 lbs 0 oz  There is no height or weight on file to calculate BMI.    Appearance:  awake, alert and adequately groomed  Attitude:  cooperative  Eye Contact:  good  Mood:  better  Affect:  appropriate and in normal range and mood congruent  Speech:  clear, coherent rate /rhythm are good  Psychomotor Behavior:  no evidence of tardive dyskinesia, dystonia, or tics and intact station, gait and muscle tone  Throught Process:  logical  Associations:  no loose associations  Thought Content:  no  evidence of suicidal ideation or homicidal ideation, no evidence of psychotic thought, no auditory hallucinations present and no visual hallucinations present  Insight:  good  Judgement:  intact  Oriented to:  time, person, and place  Attention Span and Concentration:  intact  Recent and Remote Memory:  intact  Language fund of knowledge are adequate               Labs:     No results found for: NTBNPI, NTBNP  Lab Results   Component Value Date    WBC 10.6 07/31/2011    HGB 10.1 (L) 07/31/2011    HCT 30.0 (L) 07/31/2011    MCV 91 07/31/2011     07/31/2011     Lab Results   Component Value Date    TSH 2.74 11/06/2002           DIagnoses:   1. Unspecified anxiety disorder.   2. Alcohol dependence.          Plan:   Will d/c buspar   Melecio 7 score 1  F/u as per counselours    Able to give informed consent:  YES       Discussed Risks/Benefits/Side Effects/Alternatives: YES      Discussed with patient many issues of addiction,triggers/ relapse, and establishing a solid recovery program.

## 2017-04-11 ENCOUNTER — HOSPITAL ENCOUNTER (OUTPATIENT)
Dept: BEHAVIORAL HEALTH | Facility: CLINIC | Age: 52
End: 2017-04-11
Attending: PSYCHIATRY & NEUROLOGY
Payer: COMMERCIAL

## 2017-04-11 PROCEDURE — 10020000 ZZH LODGING PLUS FACILITY CHARGE ADULT

## 2017-04-11 PROCEDURE — H2035 A/D TX PROGRAM, PER HOUR: HCPCS | Mod: HQ

## 2017-04-12 ENCOUNTER — HOSPITAL ENCOUNTER (OUTPATIENT)
Dept: BEHAVIORAL HEALTH | Facility: CLINIC | Age: 52
End: 2017-04-12
Attending: PSYCHIATRY & NEUROLOGY
Payer: COMMERCIAL

## 2017-04-12 PROCEDURE — 10020000 ZZH LODGING PLUS FACILITY CHARGE ADULT

## 2017-04-12 PROCEDURE — H2035 A/D TX PROGRAM, PER HOUR: HCPCS | Mod: HQ

## 2017-04-12 NOTE — PROGRESS NOTES
"Patient:  Haley Olson              Adult CD Progress Note and Treatment Plan Review     Attendance  Please refer to OP BEH CD Adult Attendance Record Documentation Flowsheet    Support group attended this week: yes    Reporting sobriety:  yes    Treatment Plan     Treatment Plan Review competed on:   4/12/17       Client preferred learning style: Visual  Hands on  Demonstration    Staff Members contributing    Sunitha Kilpatrick [facility], Li Bosch [facility], Michael Burns [facility]                     Received Supervision: yes    Client: contributed to goals and plan.    Client received copy of plan/revised plan: Yes    Client agrees with plan/revised plan: Yes    Changes to Treatment Plan: No    New Goals added since last review  no    Goals worked on since last review  Continuing stabilization, medication management,  relationships, grief/loss, consequences of use, education about addiction and MI/CD interaction, esteem building, emotional management, spirituality    Strategies effective: yes    Strategies need these changes: NA    ASAM Risk Rating:    Dimension 1  0  Pt denies any symptoms of withdrawal and appears full functioning.    Dimension 2  0  Pt denies any chronic medical issues that would impair her ability to participate in programming.     Dimension 3  2  Pt has been diagnosed with unspecified anxiety disorder per Dr Vinay Rodriguez.   Pt completed an assignment to aid her in affirming herself and building healthy esteem.   She completed assignments on developing emotional coping skills and managing stress effectively.  Pt attended the grief/loss focus group.  Pt's suicide risk rating resulted as \"no identified risk\" this week.  Pt denies any thoughts of suicide or self harm at this time.       Dimension 4  1  Pt attends all programming and participates in exercises.  She shared an assignment on consequences of her use giving very specific examples of values violated, emotional consequences and harmful " "behaviors.  Pt has been encouraged to continue to  increase her internal motivation for ongoing recovery .        Dimension 5  4  Pt reports she is not experiencing cravings this past week.  Pt reports feeling nothing is hindering her from being successful in Lodging Plus this week.  She plans to attend relapse prevention  workshops over the upcoming weekend to aid her in identifying triggers/warning signs and beginning to develop coping skills.  Pt attended the spiritual care focus group with Michael MILLIGAN in attendance.       Dimension 6  4  Pt attended relationship workshops.  She participated in group sessions focusing on \"mothers\" which appeared to be beneficial for her in reflecting on aspects of the relationship she has had with her mother.  Pt completed an assignment listing 25 sober activities she may incorporate into her recovery lifestyle.   Pt has been attending AA meetings and spending time with female peers.  Pt invited her son and ex-spouse to participate in the family week program.      Any changes in Vulnerable Adult Status?  No  If yes, add to treatment plan and individual abuse prevention plan.    Family Involvement:   None scheduled this week    Data:   client did participate    Intervention:   Aftercare planning  Counselor feedback  Education  Emotional management  Group feedback  Relapse prevention  Client & counselor reviewed and signed ISP & assessment summary    Assessment:   Stages of Change Model  contemplation    Appears/Sounds:  Cooperative  Engaged  Less Anxious    Plan:  Focus on recovery environment  Monitor emotional/physical health      SRINI Crump      "

## 2017-04-12 NOTE — PROGRESS NOTES
Name: Haley Olson  Date: 4/12/2017  Medical Record: [id number]    Envelope Number: [id number]    List of Contents (List each item separately in new row):   Nicotine patches 21 mg; Nicotine patches 14 mg; Buspirone HCL 7.5 mg tabs    Admission:  I am responsible for any personal items that are not sent to the safe or pharmacy.  [facility] is not responsible for loss, theft or damage of any property in my possession.      Patient Signature:  ___________________________________________       Date/Time:__________________________    Staff Signature: __________________________________       Date/Time:__________________________    2nd Staff person, if patient is unable/unwilling to sign:      __________________________________________________________       Date/Time: __________________________      Discharge:  [facility] has returned all of my personal belongings:    Patient Signature: ________________________________________     Date/Time: ____________________________________    Staff Signature: ______________________________________     Date/Time:_____________________________________

## 2017-04-13 ENCOUNTER — HOSPITAL ENCOUNTER (OUTPATIENT)
Dept: BEHAVIORAL HEALTH | Facility: CLINIC | Age: 52
End: 2017-04-13
Attending: PSYCHIATRY & NEUROLOGY
Payer: COMMERCIAL

## 2017-04-13 ENCOUNTER — TELEPHONE (OUTPATIENT)
Dept: BEHAVIORAL HEALTH | Facility: CLINIC | Age: 52
End: 2017-04-13

## 2017-04-13 PROCEDURE — 10020000 ZZH LODGING PLUS FACILITY CHARGE ADULT

## 2017-04-13 PROCEDURE — H2035 A/D TX PROGRAM, PER HOUR: HCPCS | Mod: HQ

## 2017-04-13 NOTE — TELEPHONE ENCOUNTER
A phone call was made as follow-up to the Family Program mailing for the week of 4/17/17. Message left or spoke in person to individuals on [facility].

## 2017-04-14 ENCOUNTER — HOSPITAL ENCOUNTER (OUTPATIENT)
Dept: BEHAVIORAL HEALTH | Facility: CLINIC | Age: 52
End: 2017-04-14
Attending: PSYCHIATRY & NEUROLOGY
Payer: COMMERCIAL

## 2017-04-14 PROCEDURE — 10020000 ZZH LODGING PLUS FACILITY CHARGE ADULT

## 2017-04-14 PROCEDURE — H2035 A/D TX PROGRAM, PER HOUR: HCPCS | Mod: HQ

## 2017-04-14 NOTE — PROGRESS NOTES
"4/14/17  Patient presented \"Guilt and Shame\" assignment and received constructive feedback from group peers. Pt discussed the cycle of shame including ways she shames others in response to her own shame being triggered. Pt's peers provided supportive and honest feedback regarding pt's judgemental attitude creating distance in pt's relationships. Pt was open to difficult feedback on the effect her attitude has had on peers and thanked the group for their honesty. Pt reported \"I think I love you girls even more now than I did before.\" Pt to continue working on treatment plan goals and plan.   SRINI Chiu  "

## 2017-04-15 ENCOUNTER — HOSPITAL ENCOUNTER (OUTPATIENT)
Dept: BEHAVIORAL HEALTH | Facility: CLINIC | Age: 52
End: 2017-04-15
Attending: PSYCHIATRY & NEUROLOGY
Payer: COMMERCIAL

## 2017-04-15 PROCEDURE — H2035 A/D TX PROGRAM, PER HOUR: HCPCS | Mod: HQ

## 2017-04-15 PROCEDURE — 10020000 ZZH LODGING PLUS FACILITY CHARGE ADULT

## 2017-04-16 ENCOUNTER — HOSPITAL ENCOUNTER (OUTPATIENT)
Dept: BEHAVIORAL HEALTH | Facility: CLINIC | Age: 52
End: 2017-04-16
Attending: PSYCHIATRY & NEUROLOGY
Payer: COMMERCIAL

## 2017-04-16 PROCEDURE — H2035 A/D TX PROGRAM, PER HOUR: HCPCS | Mod: HQ

## 2017-04-16 PROCEDURE — 10020000 ZZH LODGING PLUS FACILITY CHARGE ADULT

## 2017-04-16 NOTE — PROGRESS NOTES
Name: Haley Olson  Date: 4/16/2017  Medical Record: [id number]    Envelope Number: [id number]    List of Contents (List each item separately in new row):   1 box of clear Nicotine transdermal system patch 21mg  9 pcs of Nicotine Transdermal system patch 14mg  Admission:  I am responsible for any personal items that are not sent to the safe or pharmacy.  [facility] is not responsible for loss, theft or damage of any property in my possession.      Patient Signature:  ___________________________________________       Date/Time:__________________________    Staff Signature: __________________________________       Date/Time:__________________________    2nd Staff person, if patient is unable/unwilling to sign:      __________________________________________________________       Date/Time: __________________________      Discharge:  [facility] has returned all of my personal belongings:    Patient Signature: ________________________________________     Date/Time: ____________________________________    Staff Signature: ______________________________________     Date/Time:_____________________________________

## 2017-04-16 NOTE — PROGRESS NOTES
Name: Haley Olson  Date: 4/16/2017  Medical Record: [id number]    Envelope Number: [id number]    List of Contents (List each item separately in new row):   1 box of clear Nicotine transdermal system patch 21 mg  1 box of Benadryl allergy- Diphenhydramine HCL 25 mg    Admission:  I am responsible for any personal items that are not sent to the safe or pharmacy.  [facility] is not responsible for loss, theft or damage of any property in my possession.      Patient Signature:  ___________________________________________       Date/Time:__________________________    Staff Signature: __________________________________       Date/Time:__________________________    2nd Staff person, if patient is unable/unwilling to sign:      __________________________________________________________       Date/Time: __________________________      Discharge:  [facility] has returned all of my personal belongings:    Patient Signature: ________________________________________     Date/Time: ____________________________________    Staff Signature: ______________________________________     Date/Time:_____________________________________

## 2017-04-16 NOTE — PROGRESS NOTES
This writer was informed by nursing staff at 1:30pm that [facility] Sun[facility]. Nursing staff stated that patient was last seen in [facility] at 12:45pm on Sun. 4/16/2017. Nursing staff stated that patient's clothes were gone, but patient did not check-out and take her medications with her. Patient also did not checkout and take her cell phone. Patient did not inform [facility] staff she was leaving. Note: It was brought to this writer's attention in this addendum that the patient did not have a cell phone checked-in with security. The only items left by the patient were her medications and a blanket.

## 2017-04-17 NOTE — PROGRESS NOTES
[address] [place] [address]        Haley Olson, 1965, was admitted for evaluation/treatment of chemical dependency at [facility].  This person took part in these program(s):    ______ The Inpatient Program   ______ The Outpatient Program   ___x__ The Lodging Plus Program   ______ Lodging Day Outpatient       Date admitted: 03/29/17   Date discharged: 04/16/17    Type of discharge:   ______ Satisfactory - completed evaluation / treatment   ______ Discharged without completing   ______ Behavioral discharge   ______ Transferred to another chemical dependency program   ______ Transferred to another type of service   ___x__ Left against medical advice (AMA) / [facility]       Comments:       Counselor: SRINI Crump                       Date: 4/17/2017             Time: 7:50 AM

## 2017-04-21 NOTE — PROGRESS NOTES
CHEMICAL DEPENDENCY DISCHARGE SUMMARY      EVALUATION COUNSELOR:  Eleni Urbina [facility], [facility].   TREATMENT COUNSELORS:  Michael Burns, [facility], PRETTY Ortiz, [facility].   REFERRAL SOURCE:  Self.   PROGRAM:  [facility] Depend[facility].    ADMISSION DATE:  03/29/2017.   DISCHARGE DATE:  04/16/2017.    LAST SESSION DATE:  04/16/2017.   ADMISSION DIAGNOSIS:  303.90/F10.20, alcohol use disorder, severe.   DISCHARGE DIAGNOSIS:  303.90/F10.20, alcohol use disorder, severe.   DISCHARGE STATUS:  The patient left against staff advice.   LAST USE DATE:  As provided by patient, 03/28/2017.   DAYS OF TREATMENT COMPLETED:  18.      PRESENTING INFORMATION:  [facility].  She reported she is here for her drinking.  She said she does not know what is bringing her here now and later reported she is hung over every day.  She reports she lost another job due to her drinking.      SERVICES PROVIDED:  Assessment, patient orientation, treatment planning, individual counseling, group therapy sessions, family care counseling, lectures, workshops focusing on relapse prevention, relationships, other addictions, recovery topics and aftercare planning.      ISSUES ADDRESSED IN TREATMENT:   DIMENSION 1/ACUTE INTOXICATION WITHDRAWAL POTENTIAL:  Initial risk factor 1 Current risk factor 0.  The patient reports her last use of alcohol was 03/28/2017.  The patient appeared fully functioning.      DIMENSION 2/BIOMEDICAL CONDITIONS AND COMPLAINTS:  Initial risk factor 1 Current risk factor 0.  On admission, the patient reported that she did not have any medical diagnosis however, she had an inhaler to use as needed.  The patient was full1y functioning and able to seek medical care as needed.      DIMENSION 3/EMOTIONAL/BEHAVIORAL/COGNITIVE CONDITIONS AND COMPLICATIONS:  Initial  "risk factor 2:  Current risk factor 2.  The patient reports using alcohol as a means of coping with negative emotions.  The patient completed How to Live With Emotions and 10 steps to manage emotions and stress.  The patient did not complete the self-sabotage and self-defeating beliefs assignment.  The patient reports she has anxiety and depression.  The patient was seen by Dr. Rodriguez and she followed recommendations.  The patient had a suicide risk assessment during her assessment.  Her risk level was \"present/nonurgent.\"  The patient denied any thoughts of suicide ideation or self-harm during her time in [facility].  The patient reports her identity has been constructed around the use of alcohol.  The patient completed the \"Affirmations/Book of Me\" assignment.  She practiced daily affirmations and a gratitude list.  The patient read \"Striking A Balance\" and was journaling on how this relates to her.  She did not discuss a plan for creating balance in recovery.  The patient reports she has unresolved grief associated with her addiction.  She attended the grief group to begin to heal from her grief. The risk factor remained the same as she has not completed all interventions in this dimension.     DIMENSION 4/READINESS FOR CHANGE:  Initial risk factor 0:  Current risk factor 4.  The patient has consequences to herself and others due to use.  The patient completed the consequences assignment where she was able to identify values violated, emotional consequences and insane behaviors.  The patient did not complete the group assignment where she developed an individual collage about what her life would look like in 5 years if she continued using versus if she were to remain sober.  The patient's risk factor increased in this dimension because she left [facility].        DIMENSION 5/RELAPSE, CONTINUED USE, CONTINUED PROBLEM POTENTIAL:  Initial risk factor 4.  Current risk factor 4.  The patient lacks insight into her " personal relapse process, triggers, warning signs and lacks coping skills.  The patient attended the relapse prevention workshop.  She did not complete her relapse prevention packet and entering a step-down program because she left [facility].  The patient reports guilt and shame for past use and behaviors.  The patient completed the guilt and shame assignment along with feeling better to help her begin forgiving herself for the past.  The patient's risk factor did not change becase she left [facility].      DIMENSION 6/RECOVERY ENVIRONMENT:  Initial risk factor 3:  Current risk factor 4.  The patient's relationships with loved ones are strained due to her use.  The patient left prior to her scheduled family program.  The patient lacks a sober support network in recovery.  She did spend free time with female peers and attended at least three 12-step meetings weekly while in [facility].  She did not apply for a temporary sponsor.  The patient has few sober meaningful activities for free time.  The patient listed 25 sober activities she would like to do or try to begin learning to have sober fun.  She did not complete her relapse prevention handout. Risk factor increased because she left [facility].     STRENGTHS:  As identified by the patient, she is loyal, honest and trustworthy.      PROGNOSIS:  Unfavorable, however, this could be upgraded if she were to enter a residential chemical dependency program until discharged with counselor approval.      LIVING ARRANGEMENTS AT DISCHARGE:  Unknown as patient had planned to move after completing [facility].      CONTINUING CARE RECOMMENDATIONS AND REFERRALS:   1.  Abstain from all mood-altering substances except those prescribed if nonaddictive.   2.  Attend at least two 12-step meetings weekly until entering another residential program.   3.  Obtain a female sponsor and maintain regular contact.   4.  Enter a residential program until discharged with counselor approval.   5.  Comply with  the rules and regulations of residential program.   6.  Monitor and comply with the advice of doctor regarding mental and physical health.  Remain medication compliant.   7.  Continue investment in building sober support network in recovery.   8.  Continue monitoring and understanding of relapse triggers and stressors through the use and development of healthy coping skills.   9.  Continue to pursue employment or volunteer activities for free time.         This information has been disclosed to you from records protected by Federal confidentiality rules (42 CFR part 2). The Federal rules prohibit you from making any further disclosure of this information unless further disclosure is expressly permitted by the written consent of the person to whom it pertains or as otherwise permitted by 42 CFR part 2. A general authorization for the release of medical or other information is NOT sufficient for this purpose. The Federal rules restrict any use of the information to criminally investigate or prosecute any alcohol or drug abuse patient.      PRETTY BLACKMON, [facility]             D: 2017 09:00   T: 2017 09:30   MT: SIRISHA      Name:     VIANNEY BECKFORD   MRN:      [id number]        Account:      [id number]   :      1965           Visit Date:   2017      Document: [id number]

## 2020-03-05 ENCOUNTER — HOSPITAL ENCOUNTER (EMERGENCY)
Facility: CLINIC | Age: 55
Discharge: HOME | End: 2020-03-05
Attending: EMERGENCY MEDICINE | Admitting: EMERGENCY MEDICINE
Payer: COMMERCIAL

## 2020-03-05 ENCOUNTER — APPOINTMENT (OUTPATIENT)
Dept: CT IMAGING | Facility: CLINIC | Age: 55
End: 2020-03-05
Attending: EMERGENCY MEDICINE
Payer: COMMERCIAL

## 2020-03-05 VITALS
OXYGEN SATURATION: 97 % | SYSTOLIC BLOOD PRESSURE: 118 MMHG | TEMPERATURE: 97.9 F | DIASTOLIC BLOOD PRESSURE: 77 MMHG | HEART RATE: 86 BPM | WEIGHT: 144 LBS | RESPIRATION RATE: 16 BRPM | BODY MASS INDEX: 25.92 KG/M2

## 2020-03-05 DIAGNOSIS — N83.202 OVARIAN CYST, LEFT: ICD-10-CM

## 2020-03-05 DIAGNOSIS — R10.84 ABDOMINAL PAIN, GENERALIZED: ICD-10-CM

## 2020-03-05 LAB
ALBUMIN SERPL-MCNC: 4 G/DL (ref 3.4–5)
ALBUMIN UR-MCNC: NEGATIVE MG/DL
ALP SERPL-CCNC: 54 U/L (ref 40–150)
ALT SERPL W P-5'-P-CCNC: 29 U/L (ref 0–50)
ANION GAP SERPL CALCULATED.3IONS-SCNC: 7 MMOL/L (ref 3–14)
APPEARANCE UR: CLEAR
AST SERPL W P-5'-P-CCNC: 24 U/L (ref 0–45)
BASOPHILS # BLD AUTO: 0 10E9/L (ref 0–0.2)
BASOPHILS NFR BLD AUTO: 0.3 %
BILIRUB SERPL-MCNC: 0.4 MG/DL (ref 0.2–1.3)
BILIRUB UR QL STRIP: NEGATIVE
BUN SERPL-MCNC: 7 MG/DL (ref 7–30)
C COLI+JEJUNI+LARI FUSA STL QL NAA+PROBE: NOT DETECTED
C DIFF TOX B STL QL: NEGATIVE
CA-I BLD-SCNC: 4.7 MG/DL (ref 4.4–5.2)
CALCIUM SERPL-MCNC: 8.9 MG/DL (ref 8.5–10.1)
CHLORIDE SERPL-SCNC: 107 MMOL/L (ref 94–109)
CO2 BLDCOV-SCNC: 27 MMOL/L (ref 21–28)
CO2 SERPL-SCNC: 25 MMOL/L (ref 20–32)
COLOR UR AUTO: ABNORMAL
CREAT SERPL-MCNC: 0.45 MG/DL (ref 0.52–1.04)
DIFFERENTIAL METHOD BLD: NORMAL
EC STX1 GENE STL QL NAA+PROBE: NOT DETECTED
EC STX2 GENE STL QL NAA+PROBE: NOT DETECTED
ENTERIC PATHOGEN COMMENT: NORMAL
EOSINOPHIL # BLD AUTO: 0.1 10E9/L (ref 0–0.7)
EOSINOPHIL NFR BLD AUTO: 2.1 %
ERYTHROCYTE [DISTWIDTH] IN BLOOD BY AUTOMATED COUNT: 14.1 % (ref 10–15)
GFR SERPL CREATININE-BSD FRML MDRD: >90 ML/MIN/{1.73_M2}
GLUCOSE BLD-MCNC: 83 MG/DL (ref 70–99)
GLUCOSE SERPL-MCNC: 82 MG/DL (ref 70–99)
GLUCOSE UR STRIP-MCNC: NEGATIVE MG/DL
HCG SERPL QL: NEGATIVE
HCT VFR BLD AUTO: 40 % (ref 35–47)
HCT VFR BLD CALC: 40 %PCV (ref 35–47)
HGB BLD CALC-MCNC: 13.6 G/DL (ref 11.7–15.7)
HGB BLD-MCNC: 13.8 G/DL (ref 11.7–15.7)
HGB UR QL STRIP: NEGATIVE
IMM GRANULOCYTES # BLD: 0 10E9/L (ref 0–0.4)
IMM GRANULOCYTES NFR BLD: 0.3 %
INR PPP: 0.94 (ref 0.86–1.14)
INTERPRETATION ECG - MUSE: NORMAL
KETONES UR STRIP-MCNC: NEGATIVE MG/DL
LACTATE BLD-SCNC: 1.7 MMOL/L (ref 0.7–2)
LEUKOCYTE ESTERASE UR QL STRIP: NEGATIVE
LIPASE SERPL-CCNC: 138 U/L (ref 73–393)
LYMPHOCYTES # BLD AUTO: 2.5 10E9/L (ref 0.8–5.3)
LYMPHOCYTES NFR BLD AUTO: 39.2 %
MCH RBC QN AUTO: 32.2 PG (ref 26.5–33)
MCHC RBC AUTO-ENTMCNC: 34.5 G/DL (ref 31.5–36.5)
MCV RBC AUTO: 94 FL (ref 78–100)
MONOCYTES # BLD AUTO: 0.5 10E9/L (ref 0–1.3)
MONOCYTES NFR BLD AUTO: 7.6 %
NEUTROPHILS # BLD AUTO: 3.2 10E9/L (ref 1.6–8.3)
NEUTROPHILS NFR BLD AUTO: 50.5 %
NITRATE UR QL: NEGATIVE
NOROV GI+II ORF1-ORF2 JNC STL QL NAA+PR: NOT DETECTED
NRBC # BLD AUTO: 0 10*3/UL
NRBC BLD AUTO-RTO: 0 /100
PCO2 BLDV: 40 MM HG (ref 40–50)
PH BLDV: 7.44 PH (ref 7.32–7.43)
PH UR STRIP: 5.5 PH (ref 5–7)
PLATELET # BLD AUTO: 222 10E9/L (ref 150–450)
PO2 BLDV: 58 MM HG (ref 25–47)
POTASSIUM BLD-SCNC: 4.7 MMOL/L (ref 3.4–5.3)
POTASSIUM SERPL-SCNC: 3.5 MMOL/L (ref 3.4–5.3)
PROT SERPL-MCNC: 7.5 G/DL (ref 6.8–8.8)
RBC # BLD AUTO: 4.28 10E12/L (ref 3.8–5.2)
RBC #/AREA URNS AUTO: <1 /HPF (ref 0–2)
RVA NSP5 STL QL NAA+PROBE: NOT DETECTED
SALMONELLA SP RPOD STL QL NAA+PROBE: NOT DETECTED
SAO2 % BLDV FROM PO2: 91 %
SHIGELLA SP+EIEC IPAH STL QL NAA+PROBE: NOT DETECTED
SODIUM BLD-SCNC: 140 MMOL/L (ref 133–144)
SODIUM SERPL-SCNC: 139 MMOL/L (ref 133–144)
SOURCE: ABNORMAL
SP GR UR STRIP: 1 (ref 1–1.03)
SPECIMEN SOURCE: NORMAL
TROPONIN I SERPL-MCNC: <0.015 UG/L (ref 0–0.04)
UROBILINOGEN UR STRIP-MCNC: NORMAL MG/DL (ref 0–2)
V CHOL+PARA RFBL+TRKH+TNAA STL QL NAA+PR: NOT DETECTED
WBC # BLD AUTO: 6.3 10E9/L (ref 4–11)
WBC #/AREA URNS AUTO: <1 /HPF (ref 0–5)
Y ENTERO RECN STL QL NAA+PROBE: NOT DETECTED

## 2020-03-05 PROCEDURE — 40000498 ZZHCL STATISTIC POTASSIUM ED POCT

## 2020-03-05 PROCEDURE — 87493 C DIFF AMPLIFIED PROBE: CPT | Mod: XU | Performed by: EMERGENCY MEDICINE

## 2020-03-05 PROCEDURE — 82803 BLOOD GASES ANY COMBINATION: CPT

## 2020-03-05 PROCEDURE — 96375 TX/PRO/DX INJ NEW DRUG ADDON: CPT | Performed by: EMERGENCY MEDICINE

## 2020-03-05 PROCEDURE — 40000501 ZZHCL STATISTIC HEMATOCRIT ED POCT

## 2020-03-05 PROCEDURE — 40000502 ZZHCL STATISTIC GLUCOSE ED POCT

## 2020-03-05 PROCEDURE — 25800030 ZZH RX IP 258 OP 636: Performed by: EMERGENCY MEDICINE

## 2020-03-05 PROCEDURE — 80053 COMPREHEN METABOLIC PANEL: CPT | Performed by: EMERGENCY MEDICINE

## 2020-03-05 PROCEDURE — 25000125 ZZHC RX 250: Performed by: EMERGENCY MEDICINE

## 2020-03-05 PROCEDURE — 25000128 H RX IP 250 OP 636: Performed by: EMERGENCY MEDICINE

## 2020-03-05 PROCEDURE — 74177 CT ABD & PELVIS W/CONTRAST: CPT

## 2020-03-05 PROCEDURE — 96374 THER/PROPH/DIAG INJ IV PUSH: CPT | Mod: 59 | Performed by: EMERGENCY MEDICINE

## 2020-03-05 PROCEDURE — 83605 ASSAY OF LACTIC ACID: CPT | Performed by: EMERGENCY MEDICINE

## 2020-03-05 PROCEDURE — 99285 EMERGENCY DEPT VISIT HI MDM: CPT | Mod: 25 | Performed by: EMERGENCY MEDICINE

## 2020-03-05 PROCEDURE — 81001 URINALYSIS AUTO W/SCOPE: CPT | Performed by: EMERGENCY MEDICINE

## 2020-03-05 PROCEDURE — 84484 ASSAY OF TROPONIN QUANT: CPT | Performed by: EMERGENCY MEDICINE

## 2020-03-05 PROCEDURE — 87506 IADNA-DNA/RNA PROBE TQ 6-11: CPT | Performed by: EMERGENCY MEDICINE

## 2020-03-05 PROCEDURE — 84703 CHORIONIC GONADOTROPIN ASSAY: CPT | Performed by: EMERGENCY MEDICINE

## 2020-03-05 PROCEDURE — 83690 ASSAY OF LIPASE: CPT | Performed by: EMERGENCY MEDICINE

## 2020-03-05 PROCEDURE — 99285 EMERGENCY DEPT VISIT HI MDM: CPT | Mod: Z6 | Performed by: EMERGENCY MEDICINE

## 2020-03-05 PROCEDURE — 40000497 ZZHCL STATISTIC SODIUM ED POCT

## 2020-03-05 PROCEDURE — 85025 COMPLETE CBC W/AUTO DIFF WBC: CPT | Performed by: EMERGENCY MEDICINE

## 2020-03-05 PROCEDURE — 82330 ASSAY OF CALCIUM: CPT

## 2020-03-05 PROCEDURE — 96361 HYDRATE IV INFUSION ADD-ON: CPT | Performed by: EMERGENCY MEDICINE

## 2020-03-05 PROCEDURE — 85610 PROTHROMBIN TIME: CPT | Performed by: EMERGENCY MEDICINE

## 2020-03-05 RX ORDER — ONDANSETRON 2 MG/ML
4 INJECTION INTRAMUSCULAR; INTRAVENOUS ONCE
Status: COMPLETED | OUTPATIENT
Start: 2020-03-05 | End: 2020-03-05

## 2020-03-05 RX ORDER — IOPAMIDOL 755 MG/ML
100 INJECTION, SOLUTION INTRAVASCULAR ONCE
Status: COMPLETED | OUTPATIENT
Start: 2020-03-05 | End: 2020-03-05

## 2020-03-05 RX ORDER — SODIUM CHLORIDE 9 MG/ML
1000 INJECTION, SOLUTION INTRAVENOUS CONTINUOUS
Status: DISCONTINUED | OUTPATIENT
Start: 2020-03-05 | End: 2020-03-05 | Stop reason: HOSPADM

## 2020-03-05 RX ORDER — HYDROMORPHONE HYDROCHLORIDE 1 MG/ML
0.3 INJECTION, SOLUTION INTRAMUSCULAR; INTRAVENOUS; SUBCUTANEOUS
Status: COMPLETED | OUTPATIENT
Start: 2020-03-05 | End: 2020-03-05

## 2020-03-05 RX ADMIN — IOPAMIDOL 70 ML: 755 INJECTION, SOLUTION INTRAVENOUS at 18:33

## 2020-03-05 RX ADMIN — SODIUM CHLORIDE 1000 ML: 9 INJECTION, SOLUTION INTRAVENOUS at 17:37

## 2020-03-05 RX ADMIN — ONDANSETRON 4 MG: 2 INJECTION INTRAMUSCULAR; INTRAVENOUS at 17:37

## 2020-03-05 RX ADMIN — SODIUM CHLORIDE 58 ML: 9 INJECTION, SOLUTION INTRAVENOUS at 18:33

## 2020-03-05 RX ADMIN — HYDROMORPHONE HYDROCHLORIDE 0.3 MG: 1 INJECTION, SOLUTION INTRAMUSCULAR; INTRAVENOUS; SUBCUTANEOUS at 17:50

## 2020-03-05 ASSESSMENT — ENCOUNTER SYMPTOMS
SHORTNESS OF BREATH: 0
VOMITING: 0
DIARRHEA: 1
ABDOMINAL PAIN: 1
NECK PAIN: 0
DYSURIA: 0
FEVER: 0
NAUSEA: 1
COUGH: 0
NECK STIFFNESS: 0
BACK PAIN: 0
FREQUENCY: 1
ANAL BLEEDING: 1
NUMBNESS: 0
HEMATURIA: 0
HEADACHES: 0
COLOR CHANGE: 0
WEAKNESS: 0

## 2020-03-05 NOTE — ED AVS SNAPSHOT
[facility], [place], Emergency Department  [address]  [facility] [address]  Phone:  [phone]  Fax:  [phone]                                    Haley Olson   MRN: [id number]    Department:  [facility], Emergency Department   Date of Visit:  3/5/2020           After Visit Summary Signature Page    I have received my discharge instructions, and my questions have been answered. I have discussed any challenges I see with this plan with the nurse or doctor.    ..........................................................................................................................................  Patient/Patient Representative Signature      ..........................................................................................................................................  Patient Representative Print Name and Relationship to Patient    ..................................................               ................................................  Date                                   Time    ..........................................................................................................................................  Reviewed by Signature/Title    ...................................................              ..............................................  Date                                               Time          22EPIC Rev 08/18

## 2020-03-05 NOTE — ED PROVIDER NOTES
"    [facility] EMERGENCY DEPARTMENT ([facility])    3/05/20       History     Chief Complaint   Patient presents with     Abdominal Pain     Patient reports generalized abdominal pain worse in right side started today, \"feel bloated\"      Nausea, Vomiting, & Diarrhea     Nausea without emesis, diarrhea started today with four bright blood stools episodes      HPI  Haley Olson is a 54 year old female past medical history notable for previous chemical dependency, depression, anxiety, who is perimenopausal but still having menstrual periods, with an additional history of chronic urticaria, who presents today with abdominal discomfort, bloating and bloody stools.    Patient reports that she began feeling somewhat bloated a few days ago.  She reports that she has a history of chronic urticaria and was noticing some hives on her bilateral hips which is not atypical for her but took a dose of a friend's Excedrin this morning because of some discomfort related to her chronic hives, though again this was all usual baseline for her.  After taking that medication a number of hours later she noted a little bit of GI upset.  Not stephanie pain which is not quite normal status for her.  Because of that she did not drink all of her normal coffee in the morning.  As the day progressed to became a bit more uncomfortable for her, and she began to feel increasingly more bloated.  With this she has had some nausea but no vomiting.  Over the course of the day, she also developed some bright red blood per rectum.  She reports that she will have some loose stool, and then some portions that appear more just like blood or even small clots of blood.  She reports that she thinks it is fairly bright.  No dark melena or black tarry stools.  With this she denies any true fevers.  She reports that she is perimenopausal and will occasionally have some hot flashes or chills but has not noticed anything new for her from previous.  Her last " menstrual period ended about a week ago.  This was normal for her.  She is not had any vaginal discharge.  She notes some urinary frequency today but no dysuria, no hematuria.    Abdominal pain is located throughout the abdomen, though she believes it somewhat worse in the upper midline and right side of her abdomen.  She has had a previous  but denies any other abdominal surgeries and reports that she has not had a cholecystectomy or appendectomy/still has those normal organs.    She denies ever having symptoms like this previously, no history of GI bleeds or colitis or anything like that in the past.  She started on a new blood pressure medicine a couple of months ago but had been doing well since that time.  She denies being on any antibiotics in the last number of months.  No change in diet or supplements.  No known ill exposures.    Outside of her chronic urticaria she has had no rashes or skin changes.  No chest pain or breathing symptoms.  No headaches, vision or hearing changes.  No numbness, tingling or focal weakness.  No extremity symptoms.  No neck or back symptoms.  No other new symptoms or complaints at this time.  Please see ROS for further details    This part of the medical record was transcribed by Ken Henriquez, Medical Scribe.   I have reviewed the Medications, Allergies, Past Medical and Surgical History, and Social History in the [employer] system.  Past Medical History:   Diagnosis Date     Seasonal allergies        Past Surgical History:   Procedure Laterality Date     DILATION AND CURETTAGE SUCTION  2011    Procedure:DILATION AND CURETTAGE SUCTION; Surgeon:YANNICK STERN; Location:[facility] OR     GYN SURGERY         Family History   Problem Relation Age of Onset     Substance Abuse Mother      Cardiovascular Father         heart attack     Substance Abuse Brother      Substance Abuse Brother        Social History     Tobacco Use     Smoking status: Current Every Day Smoker      Packs/day: 1.00     Types: Cigarettes     Smokeless tobacco: Never Used   Substance Use Topics     Alcohol use: Yes     Comment: Not currently        Review of Systems   Constitutional: Negative for fever.   HENT: Negative for hearing loss.    Eyes: Negative for visual disturbance.   Respiratory: Negative for cough and shortness of breath.    Cardiovascular: Negative for chest pain.   Gastrointestinal: Positive for abdominal pain, anal bleeding, diarrhea and nausea. Negative for vomiting.   Genitourinary: Positive for frequency. Negative for dysuria, hematuria, urgency and vaginal discharge.   Musculoskeletal: Negative for back pain, neck pain and neck stiffness.   Skin: Positive for rash. Negative for color change and pallor.   Allergic/Immunologic: Negative for immunocompromised state.   Neurological: Negative for syncope, weakness, numbness and headaches.   All other systems reviewed and are negative.      Physical Exam   BP: 118/77  Pulse: 86  Temp: 97.9  F (36.6  C)  Resp: 16  Weight: 65.3 kg (144 lb)  SpO2: 97 %      CONSTITUTIONAL: Well-developed and well-nourished. Awake and alert. Non-toxic appearance. No acute distress.   HENT:   - Head: Normocephalic and atraumatic.   - Ears: Hearing and external ear grossly normal.   - Nose: Nose normal. No rhinorrhea. No epistaxis.   - Mouth/Throat: MMM  EYES: Conjunctivae and lids are normal. No scleral icterus.   NECK: Normal range of motion and phonation normal. Neck supple.  No tracheal deviation, no stridor. No edema or erythema noted.  CARDIOVASCULAR: Normal rate, regular rhythm and no appreciable abnormal heart sounds.  PULMONARY/CHEST: Normal work of breathing. No accessory muscle usage or stridor. No respiratory distress.  No appreciable abnormal breath sounds.  ABDOMEN: Soft, non-distended. No tenderness. No rigidity, rebound or guarding. Maybe slightly bloated but not notably distended.  Not tense in any way.  Has diffuse discomfort to palpation but worse in  the midline mid/upper abdomen as well as the right mid upper abdomen, less so on the right lower quadrant but more so on the whole right side than the left.  No point peritoneal findings. No palpable masses or abnormal pulsatility appreciated.  MUSCULOSKELETAL: Extremities warm and seemingly well perfused. No edema or calf tenderness.  NEUROLOGIC: Awake, alert. Not disoriented. She displays no atrophy and no tremor. Normal tone. No seizure activity. GCS 15  SKIN: Skin is warm and dry. No rash noted. No diaphoresis. No pallor.   PSYCHIATRIC: Normal mood and affect. Speech and behavior normal. Thought processes linear. Cognition and memory are normal.      ED Course            EKG Interpretation:      Interpreted by Larisa Barillas MD  Time reviewed: 4:25PM  Symptoms at time of EKG: abdominal pain   Rhythm: normal sinus   Rate: Normal, 70bpm  Axis: Normal  Ectopy: none  Conduction: normal  ST Segments/ T Waves: No acute appearing  ST-T wave changes  Comparison to prior: No significant changes from EKG tracing we have here from 7/25/2011 EKG   --- There were no more recent tracings available though in review of Care Everywhere there is a written description of recent EKG.  [facility] E[facility] interpretation from 1/4/2020 reportedly noted sinus rhythm with marked sinus arrhythmia with short CO but otherwise normal EKG.  Clinical Impression: Sinus rhythm         Assessments & Plan (with Medical Decision Making)   IMPRESSION: 54 year old female w/ PMH notable for previous chemical dependency, depression, anxiety, who is perimenopausal but still having menstrual periods, with an additional history of chronic urticaria, who presents today with abdominal discomfort, bloating and bloody stools.    Clinically, patient appears non-toxic in ED.  Vitals grossly WNL.  Otherwise on examination she has some mild abdominal bloating and diffuse discomfort to palpation, (worse in the upper midline and right-sided abdomen) though without  point peritoneal findings.    DDX includes, but not limited to, infectious or inflammatory colitis, enteritis, gastroenteritis, less likely pancreatitis as that wouldn t explain the bloody bowel movements unless that was just causing looser stools and she happened to have a hemorrhoidal bleed, etc.  Other things to consider would be diverticular disease, diverticulitis, less likely malignancy, et al.     PLAN: Laboratory studies, urine studies, stool studies, imaging the abdomen/pelvis, symptom management as needed  - Risks/benefits of pursuing imaging review and accepted.     RESULTS:  - Labs: normal lactate, no leukocytosis, normal Hgb (up from previous), no acute findings on CMP.  Lipase normal, troponin negative, pregnancy negative  - Urine: No UTI, no blood, no ketones  - Stool: C. difficile negative, enteric panel pending  - Imaging: Written preliminary reports reviewed:  --- CT A/P: 1.  No specific finding to explain the patient's symptoms.  2.  6.5 cm left adnexal cyst. Pelvic ultrasound follow-up in 8 weeks  recommended per guidelines below.  --- Results/reports reviewed w/ patient who expresses understanding of findings and F/U recommendations.  She reports she will call her usual PCP and Gynecologist tomorrow to arrange close follow-up    INTERVENTIONS:   - IV fluid  - IV Zofran  - IV Dilaudid x1  - Offered p.o./food or drink, but patient declined    RE-EVALUATION:  - - Patient reports some dissatisfaction that she was hungry.  She was offered food but she declined.  She also reports that her fluid through the IV was not dripping until she unhooked her own IV tubing.  That said clinically she does not seem to be in dire needs of IV fluids as she has not clinically appear notably dehydrated and her laboratory studies have now returned also not showing any significant findings for dehydration, etc.   - Pt otherwise continues to do well here in the ED, no acute issues or apparent concerning changes in vitals  or clinical appearance.    DISCUSSIONS:  - w/ Patient: I have reviewed the available findings, and recommendations. Initially patient was requesting discharge regardless of our additional recommendations.  We then did recommend at least a serial hemoglobin check, though she did not want to stay for this and want to be immediately discharged.  We were able to reach a compromise and recommend a recheck of her hemoglobin here at the bedside via i-STAT CG8.  Patient was agreeable to this, and hgb returned & not significantly changed.    --- While we do not have a definitive cause for his symptoms yet at this point, I do not have any obvious emergent findings.  Patient seems reliable in order to arrange outpatient follow-up and agrees to our return/safety instructions.  Therefore, reviewed plan,  recommendations as I think she will need further evaluation for the symptoms, and discussed how that large ovarian cyst could cause additional complications that could potentially be dangerous (such as torsion, etc.) and so in addition to just getting a general work-up reviewed strict return/safety instructions regarding that incident finding in addition to the symptoms for which she presented today.  -Reviewed need for close follow up (with PCP and Gynecology for her symptoms/presentation as well as incidental imaging findings, and while we have not found any emergent findings we have not found a definitive cause for symptoms as of yet and she will likely need further work-up, colonoscopy, etc.).  Offered to make referrals for patient but she declines and reports that she already has a PCP and Gynecologist and she will contact them in the morning to set these appointments up with soon as possible.  Also reviewed strict return/safety instructions with the patient.  She reports she will contact her PCP and gynecology teams in the morning. She expressed understanding and agreement with this plan. All questions answered to the best  of our ability at this time.       DISPOSITION/PLANNING:  - IMPRESSION: Abdominal discomfort, bloating, loose stools with blood (but normal hemoglobin and unchanged serial hemoglobin)  --- Incidentally identified large ovarian cyst  - DISPOSITION: Discharge as per patient request  - FOLLOW-UP: With PCP and Gynecology as soon as possible (patient agrees to call their clinic in the morning to arrange these appointments, and would recommend these be scheduled as soon as possible for recheck, further work-up and management).  - PENDING: Stool enteric panel  - RECOMMENDATIONS: Conservative symptom management, strict return instructions      This part of the medical record was transcribed by Daphney Rivero Medical Scribe, from a dictation done by Jayda Barillas MD.     ______________________________________________________________________       3/5/2020   [facility], EMERGENCY DEPARTMENT     Jayda Barillas MD  03/06/20 [id number]

## 2020-03-06 NOTE — DISCHARGE INSTRUCTIONS
TODAY'S VISIT:  - You should discuss all imaging/radiology tests and laboratory tests that were performed during this visit with your usual providers to ensure you continue to improve and do not need any further evaluation, testing or management.     - Please call your Primary Care team to discuss and arrange a follow-up appointment.     FOLLOW-UP:  Please make an appointment to follow up with:  - Your Primary Care Provider and Gynecologist as soon as possible.   - Please call them first thing in the morning to arrange close follow up for your symptoms and CT findings today.   - Have your providers review the results from today's visit with you again to make sure no further follow-up or additional testing is needed based on those results.     OTHER INSTRUCTIONS:  - Do your best to stay hydrated.    RETURN TO THE EMERGENCY DEPARTMENT  Return to the Emergency Department immediately for any new or worsening symptoms or any concerns.     Remember that you can always come back to the Emergency Department if you are not able to see your regular doctor in the amount of time listed above, if you get any new symptoms, or if there is anything that worries you.    Your Radiology Report includes:  IMPRESSION:   1.  No specific finding to explain the patient's symptoms.  2.  6.5 cm left adnexal cyst. Pelvic ultrasound follow-up in 8 weeks  recommended per guidelines below.     REFERENCE:  Guidelines for incidental benign or probably benign adnexal cyst on  CT/MRI.  Managing Incidental Findings on Abdominal and Pelvic CT and MRI, Part  1: White Paper of the ACR Incidental Findings Committee II on Adnexal  Findings. J Am Chuy Radiol 2013;10:675-681.     Early postmenopausal (under 55 years old):  Less than 3 cm: Benign, no follow up.     Greater than 3 cm: US follow up in 8 weeks.

## 2020-03-06 NOTE — RESULT ENCOUNTER NOTE
Final Enteric Bacteria and Virus Panel by [facility]ER[facility] Stool is NEGATIVE for Campylobacter group, Salmonella species, Shigella species, Shiga toxin 1 gene, Shiga toxin 2 gene, Vibrio group,  Yersinia enterocolitica,  Rotavirus A,  and Norovirus I and II.    No treatment or change in treatment per [facility] Lab Result protocol.

## 2020-03-06 NOTE — ED NOTES
Patient dressed, stating that she is going outside to have a cigarette since she hasn't seen anyone to tell her the results of the tests. MD aware and came in to room to talk to patient. Patient still insistent on leaving to smoke, MD discussed with her that this would not be allowed. Patient requesting to leave. MD working on discharge instructions.